# Patient Record
Sex: MALE | Race: WHITE | NOT HISPANIC OR LATINO | Employment: OTHER | URBAN - METROPOLITAN AREA
[De-identification: names, ages, dates, MRNs, and addresses within clinical notes are randomized per-mention and may not be internally consistent; named-entity substitution may affect disease eponyms.]

---

## 2018-06-14 ENCOUNTER — OFFICE VISIT (OUTPATIENT)
Dept: URGENT CARE | Facility: CLINIC | Age: 63
End: 2018-06-14
Payer: COMMERCIAL

## 2018-06-14 VITALS
DIASTOLIC BLOOD PRESSURE: 72 MMHG | SYSTOLIC BLOOD PRESSURE: 116 MMHG | RESPIRATION RATE: 16 BRPM | TEMPERATURE: 99.2 F | HEART RATE: 80 BPM | HEIGHT: 68 IN | WEIGHT: 188 LBS | BODY MASS INDEX: 28.49 KG/M2 | OXYGEN SATURATION: 95 %

## 2018-06-14 DIAGNOSIS — J98.8 RTI (RESPIRATORY TRACT INFECTION): ICD-10-CM

## 2018-06-14 PROCEDURE — 99204 OFFICE O/P NEW MOD 45 MIN: CPT | Performed by: FAMILY MEDICINE

## 2018-06-14 RX ORDER — AMOXICILLIN AND CLAVULANATE POTASSIUM 875; 125 MG/1; MG/1
1 TABLET, FILM COATED ORAL 2 TIMES DAILY
Qty: 14 TAB | Refills: 0 | Status: SHIPPED | OUTPATIENT
Start: 2018-06-14 | End: 2018-06-21

## 2018-06-14 RX ORDER — PROMETHAZINE HYDROCHLORIDE, PHENYLEPHRINE HYDROCHLORIDE AND CODEINE PHOSPHATE 6.25; 5; 1 MG/5ML; MG/5ML; MG/5ML
5 SOLUTION ORAL EVERY 8 HOURS PRN
Qty: 140 ML | Refills: 0 | Status: SHIPPED | OUTPATIENT
Start: 2018-06-14 | End: 2018-06-24

## 2018-06-14 RX ORDER — PREDNISONE 20 MG/1
40 TABLET ORAL EVERY MORNING
Qty: 12 TAB | Refills: 0 | Status: SHIPPED | OUTPATIENT
Start: 2018-06-14 | End: 2018-06-20

## 2018-06-14 ASSESSMENT — ENCOUNTER SYMPTOMS
SINUS PAIN: 1
FOCAL WEAKNESS: 0
DIZZINESS: 0
SPUTUM PRODUCTION: 0
CHILLS: 0
SORE THROAT: 1
FEVER: 0
COUGH: 1

## 2018-06-14 NOTE — PROGRESS NOTES
"Subjective:      Hussein Sarmiento is a 63 y.o. male who presents with Cough (x1wk headache, fever )    Chief Complaint   Patient presents with   • Cough     x1wk headache, fever         - This is a very pleasant 63 y.o. male with complaints of 7-10days w/ cough and sinus pain pressure w/ DC and subjective fever. No NV/CP/SOB    - denies any chronic medical issues or medication use or allergies           ALLERGIES:  Patient has no allergy information on record.     PMH:  No past medical history on file.     MEDS:    Current Outpatient Prescriptions:   •  amoxicillin-clavulanate (AUGMENTIN) 875-125 MG Tab, Take 1 Tab by mouth 2 times a day for 7 days., Disp: 14 Tab, Rfl: 0  •  Promethazine-Phenyleph-Codeine (PHENERGAN VC CODEINE) 6.25-5-10 MG/5ML Syrup, Take 5 mL by mouth every 8 hours as needed for up to 10 days., Disp: 140 mL, Rfl: 0  •  predniSONE (DELTASONE) 20 MG Tab, Take 2 Tabs by mouth every morning for 6 days., Disp: 12 Tab, Rfl: 0    ** I have documented what I find to be significant in regards to past medical, social, family and surgical history  in my HPI or under PMH/PSH/FH review section, otherwise it is contributory **             HPI    Review of Systems   Constitutional: Negative for chills and fever.   HENT: Positive for congestion, sinus pain and sore throat.    Respiratory: Positive for cough. Negative for sputum production.    Neurological: Negative for dizziness and focal weakness.          Objective:     /72   Pulse 80   Temp 37.3 °C (99.2 °F)   Resp 16   Ht 1.727 m (5' 8\")   Wt 85.3 kg (188 lb)   SpO2 95%   BMI 28.59 kg/m²      Physical Exam   Constitutional: He appears well-developed. No distress.   HENT:   Head: Normocephalic and atraumatic.   Mouth/Throat: Oropharynx is clear and moist.   Eyes: Conjunctivae are normal.   Neck: Neck supple.   Cardiovascular: Regular rhythm.    No murmur heard.  Pulmonary/Chest: Effort normal and breath sounds normal. No respiratory distress. "   Neurological: He is alert. He exhibits normal muscle tone.   Skin: Skin is warm and dry.   Psychiatric: He has a normal mood and affect. Judgment normal.   Nursing note and vitals reviewed.              Assessment/Plan:         1. RTI (respiratory tract infection)  amoxicillin-clavulanate (AUGMENTIN) 875-125 MG Tab    Promethazine-Phenyleph-Codeine (PHENERGAN VC CODEINE) 6.25-5-10 MG/5ML Syrup    predniSONE (DELTASONE) 20 MG Tab             Dx & d/c instructions discussed w/ patient and/or family members. Follow up w/ Prvt Dr or here in 3-4 days if not getting better, sooner if needed,  ER if worse and UC/PCP unavailable.        Possible side effects (i.e. Rash, GI upset/constipation, sedation, elevation of BP or sugars) of any medications given discussed.

## 2019-10-18 ENCOUNTER — TELEPHONE (OUTPATIENT)
Dept: SCHEDULING | Facility: IMAGING CENTER | Age: 64
End: 2019-10-18

## 2020-02-04 ENCOUNTER — OFFICE VISIT (OUTPATIENT)
Dept: MEDICAL GROUP | Facility: MEDICAL CENTER | Age: 65
End: 2020-02-04
Payer: MEDICARE

## 2020-02-04 VITALS
HEART RATE: 69 BPM | HEIGHT: 68 IN | BODY MASS INDEX: 27.7 KG/M2 | SYSTOLIC BLOOD PRESSURE: 120 MMHG | RESPIRATION RATE: 14 BRPM | DIASTOLIC BLOOD PRESSURE: 76 MMHG | WEIGHT: 182.76 LBS | TEMPERATURE: 98.2 F | OXYGEN SATURATION: 98 %

## 2020-02-04 DIAGNOSIS — E66.3 OVERWEIGHT: ICD-10-CM

## 2020-02-04 DIAGNOSIS — L57.0 ACTINIC KERATOSIS: ICD-10-CM

## 2020-02-04 DIAGNOSIS — Z13.29 SCREENING FOR THYROID DISORDER: ICD-10-CM

## 2020-02-04 DIAGNOSIS — Z12.83 SCREENING, MALIGNANT NEOPLASM, SKIN: ICD-10-CM

## 2020-02-04 DIAGNOSIS — Z00.00 HEALTH CARE MAINTENANCE: ICD-10-CM

## 2020-02-04 DIAGNOSIS — E78.5 DYSLIPIDEMIA: ICD-10-CM

## 2020-02-04 DIAGNOSIS — Z76.89 ENCOUNTER TO ESTABLISH CARE: ICD-10-CM

## 2020-02-04 DIAGNOSIS — Z23 NEED FOR VACCINATION: ICD-10-CM

## 2020-02-04 DIAGNOSIS — E55.9 HYPOVITAMINOSIS D: ICD-10-CM

## 2020-02-04 DIAGNOSIS — Z13.0 SCREENING, ANEMIA, DEFICIENCY, IRON: ICD-10-CM

## 2020-02-04 DIAGNOSIS — Z12.5 SCREENING FOR MALIGNANT NEOPLASM OF PROSTATE: ICD-10-CM

## 2020-02-04 DIAGNOSIS — G47.33 OSA ON CPAP: ICD-10-CM

## 2020-02-04 DIAGNOSIS — Z11.59 NEED FOR HEPATITIS C SCREENING TEST: ICD-10-CM

## 2020-02-04 PROCEDURE — 90662 IIV NO PRSV INCREASED AG IM: CPT | Performed by: INTERNAL MEDICINE

## 2020-02-04 PROCEDURE — 99214 OFFICE O/P EST MOD 30 MIN: CPT | Mod: 25 | Performed by: INTERNAL MEDICINE

## 2020-02-04 PROCEDURE — G0008 ADMIN INFLUENZA VIRUS VAC: HCPCS | Performed by: INTERNAL MEDICINE

## 2020-02-04 RX ORDER — FLUOROURACIL 50 MG/ML
SOLUTION TOPICAL
Qty: 10 ML | Refills: 2 | Status: SHIPPED | OUTPATIENT
Start: 2020-02-04 | End: 2020-03-17

## 2020-02-04 ASSESSMENT — PATIENT HEALTH QUESTIONNAIRE - PHQ9: CLINICAL INTERPRETATION OF PHQ2 SCORE: 0

## 2020-02-04 NOTE — PROGRESS NOTES
CHIEF COMPLAINT  Chief Complaint   Patient presents with   • Establish Care   get labs andres NICOLE  Hussein Sarmiento is a 65 y.o. male who presents today for the following     Dyslipidemia  The patient had abnormal lipid panel, no medications.  Diet: regular  Exercise: daily  BMI:  As above  FH: half-siblings     Hypovitaminosis D  The patient had low vitamin D level.  Vitamin D supplement: no    OW, Body mass index is 27.79 kg/m².  Diet: Regular  Exercise: Daily  No temperature intolerance. No change in hair/skin quality, BMs.   No HTN, buffalo hump, purple striae, flushing.  FH of obesity: siblings    Actinic keratosis  The patient has have multiple lesions consistent with actinic keratosis on the face and forehead.  He has significant sun exposure in the past.  No past medical history or family history of skin cancer.    KORIN on CPAP  The patient has have CPA for sleep apnea.  Denies daytime sleepiness and fatigue.    Reviewed PMH, PSH, FH, SH, ALL, HCM/IMM  Reviewed MEDS    CURRENT MEDICATIONS  No current outpatient medications on file.     No current facility-administered medications for this visit.      ALLERGIES  Allergies: Patient has no known allergies.  PAST MEDICAL HISTORY  Past Medical History:   Diagnosis Date   • Actinic keratosis    • Dyslipidemia      SURGICAL HISTORY  He  has a past surgical history that includes tonsillectomy.  SOCIAL HISTORY  Social History     Tobacco Use   • Smoking status: Never Smoker   • Smokeless tobacco: Never Used   Substance Use Topics   • Alcohol use: Not on file   • Drug use: Not on file     Social History     Patient does not qualify to have social determinant information on file (likely too young).   Social History Narrative   • Not on file     FAMILY HISTORY  Family History   Problem Relation Age of Onset   • Heart Disease Mother    • Cancer Father         colon   • Diabetes Sister    • Hypertension Sister    • Hyperlipidemia Sister    • Diabetes Brother    •  "Hypertension Brother    • Hyperlipidemia Brother      Family Status   Relation Name Status   • Mo     • Fa     • Sis  (Not Specified)   • Bro  (Not Specified)     ROS   Constitutional: Negative for fever, chills, fatigue.  HENT: Negative for congestion, sore throat.  Eyes: Negative for vision problems.   Respiratory: Negative for cough, shortness of breath.  Cardiovascular: Negative for chest pain, palpitations.   Gastrointestinal: Negative for heartburn, nausea, abdominal pain.   Genitourinary: Negative for dysuria.  Musculoskeletal: Negative for significant myalgia, back and joint pain.   Skin: As above.   Neuro: Negative for dizziness, weakness and headaches.   Endo/Heme/Allergies: Does not bruise/bleed easily.   Psychiatric/Behavioral: Negative for depression.    PHYSICAL EXAM   Blood Pressure 120/76 (BP Location: Left arm, Patient Position: Sitting, BP Cuff Size: Adult)   Pulse 69   Temperature 36.8 °C (98.2 °F) (Temporal)   Respiration 14   Height 1.727 m (5' 8\")   Weight 82.9 kg (182 lb 12.2 oz)   Oxygen Saturation 98%  Body mass index is 27.79 kg/m².  General:  NAD, well appearing  HEENT:   NC/AT, PERRLA, EOMI, TMs are clear. Oropharyngeal mucosa is pink,  without lesions;  no cervical / supraclavicular  lymphadenopathy, no thyromegaly.    Cardiovascular: RRR.   No m/r/g.       Lungs:   CTAB, no w/r/r, no respiratory distress.  Abdomen: Soft, NT/ND; no hepatosplenomegaly.  Extremities:  2+ DP and radial pulses bilaterally.  No c/c/e.   Skin:  Warm, dry.  Multiple lesions consistent with actinic keratosis on the forehead and face.  Neurologic: Alert & oriented x 3. CN II-XII grossly intact. No focal deficits.  Psychiatric:  Affect normal, mood normal, judgment normal.    Labs     Pending    Imaging     None    Assessment and Plan     Hussein Sarmiento is a 65 y.o. male    1. Dyslipidemia  Pending labs, advised low-calorie diet, daily exercise, weight control  - Comp Metabolic Panel; " Future  - Lipid Profile; Future    2. Hypovitaminosis D  Pending labs, advised 2000 units of vitamin D daily, OTC  - VITAMIN D,25 HYDROXY; Future    3. Overweight  Advised as above    4. Actinic keratosis  Advised to avoid sun exposure, use sunscreen    CRYOTHERAPY for 5 lesions no irritation or inflammation:  Risks (including, but not limited to: hypo or hyperpigmentation, redness, blister, blood blister, recurrence, need for further treatment, infection, scar) and benefits of cryotherapy discussed. Patient verbally agreed to proceed with treatment. 2 cryotherapy freeze thaw cycles of 10 seconds were applied.. Patient tolerated procedure well. Aftercare instructions given.  - discussed importance of regular sun protection/sunscreen use, SPF 30 or greater with broad spectrum coverage, need for reapplication every  minutes    - Fluorouracil 5 % Solution; Thin layer to affect skin 1-2 times daily for 2-6 weeks. Skin may blister.  Dispense: 10 mL; Refill: 2  - REFERRAL TO DERMATOLOGY    5. KORIN on CPAP  Controlled, continue current treatment and pulmonology follow-up  - REFERRAL TO PULMONOLOGY    6. Need for hepatitis C screening test  - HCV Scrn ( [id number] 1xLife); Future    7. Need for vaccination  Information was provided to the patient regarding the vaccine, including side effects. Vaccine was given by my medical assistant under my supervision.  - Influenza Vaccine, High Dose (65+ Only)    8. Screening, anemia, deficiency, iron  - CBC WITH DIFFERENTIAL; Future    9. Screening for thyroid disorder  - TSH; Future    10. Screening for malignant neoplasm of prostate  - PROSTATE SPECIFIC AG SCREENING; Future    11. Screening, malignant neoplasm, skin  - REFERRAL TO DERMATOLOGY    12. Health care maintenance  13. Encounter to establish care  Reviewed PMH, PSH, FH, SH, ALL, MEDS, HCM/IMM.   Advised healthy habits, diet, exercise.    Counseling:   - Smoking:  Nonsmoker    Followup: within 3 months labs and  annual    All questions are answered.    Please note that this dictation was created using voice recognition software, and that there might be errors of honey and possibly content.

## 2020-02-11 ENCOUNTER — HOSPITAL ENCOUNTER (OUTPATIENT)
Dept: LAB | Facility: MEDICAL CENTER | Age: 65
End: 2020-02-11
Attending: INTERNAL MEDICINE
Payer: MEDICARE

## 2020-02-11 DIAGNOSIS — E55.9 HYPOVITAMINOSIS D: ICD-10-CM

## 2020-02-11 DIAGNOSIS — Z13.0 SCREENING, ANEMIA, DEFICIENCY, IRON: ICD-10-CM

## 2020-02-11 DIAGNOSIS — Z11.59 NEED FOR HEPATITIS C SCREENING TEST: ICD-10-CM

## 2020-02-11 DIAGNOSIS — Z13.29 SCREENING FOR THYROID DISORDER: ICD-10-CM

## 2020-02-11 DIAGNOSIS — E78.5 DYSLIPIDEMIA: ICD-10-CM

## 2020-02-11 DIAGNOSIS — Z12.5 SCREENING FOR MALIGNANT NEOPLASM OF PROSTATE: ICD-10-CM

## 2020-02-11 LAB
BASOPHILS # BLD AUTO: 0.8 % (ref 0–1.8)
BASOPHILS # BLD: 0.06 K/UL (ref 0–0.12)
EOSINOPHIL # BLD AUTO: 0.12 K/UL (ref 0–0.51)
EOSINOPHIL NFR BLD: 1.6 % (ref 0–6.9)
ERYTHROCYTE [DISTWIDTH] IN BLOOD BY AUTOMATED COUNT: 47.1 FL (ref 35.9–50)
HCT VFR BLD AUTO: 43.7 % (ref 42–52)
HGB BLD-MCNC: 14.3 G/DL (ref 14–18)
IMM GRANULOCYTES # BLD AUTO: 0.02 K/UL (ref 0–0.11)
IMM GRANULOCYTES NFR BLD AUTO: 0.3 % (ref 0–0.9)
LYMPHOCYTES # BLD AUTO: 2.98 K/UL (ref 1–4.8)
LYMPHOCYTES NFR BLD: 39 % (ref 22–41)
MCH RBC QN AUTO: 31.6 PG (ref 27–33)
MCHC RBC AUTO-ENTMCNC: 32.7 G/DL (ref 33.7–35.3)
MCV RBC AUTO: 96.5 FL (ref 81.4–97.8)
MONOCYTES # BLD AUTO: 0.53 K/UL (ref 0–0.85)
MONOCYTES NFR BLD AUTO: 6.9 % (ref 0–13.4)
NEUTROPHILS # BLD AUTO: 3.94 K/UL (ref 1.82–7.42)
NEUTROPHILS NFR BLD: 51.4 % (ref 44–72)
NRBC # BLD AUTO: 0 K/UL
NRBC BLD-RTO: 0 /100 WBC
PLATELET # BLD AUTO: 271 K/UL (ref 164–446)
PMV BLD AUTO: 9.9 FL (ref 9–12.9)
RBC # BLD AUTO: 4.53 M/UL (ref 4.7–6.1)
WBC # BLD AUTO: 7.7 K/UL (ref 4.8–10.8)

## 2020-02-11 PROCEDURE — 82306 VITAMIN D 25 HYDROXY: CPT

## 2020-02-11 PROCEDURE — 84443 ASSAY THYROID STIM HORMONE: CPT

## 2020-02-11 PROCEDURE — 36415 COLL VENOUS BLD VENIPUNCTURE: CPT

## 2020-02-11 PROCEDURE — G0472 HEP C SCREEN HIGH RISK/OTHER: HCPCS

## 2020-02-11 PROCEDURE — 84153 ASSAY OF PSA TOTAL: CPT

## 2020-02-11 PROCEDURE — 80053 COMPREHEN METABOLIC PANEL: CPT

## 2020-02-11 PROCEDURE — 85025 COMPLETE CBC W/AUTO DIFF WBC: CPT

## 2020-02-11 PROCEDURE — 80061 LIPID PANEL: CPT

## 2020-02-12 LAB
25(OH)D3 SERPL-MCNC: 23 NG/ML (ref 30–100)
ALBUMIN SERPL BCP-MCNC: 4.4 G/DL (ref 3.2–4.9)
ALBUMIN/GLOB SERPL: 1.6 G/DL
ALP SERPL-CCNC: 47 U/L (ref 30–99)
ALT SERPL-CCNC: 16 U/L (ref 2–50)
ANION GAP SERPL CALC-SCNC: 9 MMOL/L (ref 0–11.9)
AST SERPL-CCNC: 25 U/L (ref 12–45)
BILIRUB SERPL-MCNC: 0.6 MG/DL (ref 0.1–1.5)
BUN SERPL-MCNC: 17 MG/DL (ref 8–22)
CALCIUM SERPL-MCNC: 9.2 MG/DL (ref 8.5–10.5)
CHLORIDE SERPL-SCNC: 105 MMOL/L (ref 96–112)
CHOLEST SERPL-MCNC: 193 MG/DL (ref 100–199)
CO2 SERPL-SCNC: 26 MMOL/L (ref 20–33)
CREAT SERPL-MCNC: 1.4 MG/DL (ref 0.5–1.4)
FASTING STATUS PATIENT QL REPORTED: NORMAL
GLOBULIN SER CALC-MCNC: 2.7 G/DL (ref 1.9–3.5)
GLUCOSE SERPL-MCNC: 97 MG/DL (ref 65–99)
HCV AB S/CO SERPL IA: NEGATIVE
HDLC SERPL-MCNC: 58 MG/DL
LDLC SERPL CALC-MCNC: 112 MG/DL
POTASSIUM SERPL-SCNC: 4.7 MMOL/L (ref 3.6–5.5)
PROT SERPL-MCNC: 7.1 G/DL (ref 6–8.2)
PSA SERPL-MCNC: 0.83 NG/ML (ref 0–4)
SODIUM SERPL-SCNC: 140 MMOL/L (ref 135–145)
TRIGL SERPL-MCNC: 114 MG/DL (ref 0–149)
TSH SERPL DL<=0.005 MIU/L-ACNC: 3.57 UIU/ML (ref 0.38–5.33)

## 2020-02-15 PROBLEM — R94.4 DECREASED GFR: Status: ACTIVE | Noted: 2020-02-15

## 2020-02-15 PROBLEM — D64.9 ANEMIA, UNSPECIFIED: Status: ACTIVE | Noted: 2020-02-15

## 2020-02-15 PROBLEM — Z00.00 MEDICARE ANNUAL WELLNESS VISIT, INITIAL: Status: ACTIVE | Noted: 2020-02-15

## 2020-02-18 ENCOUNTER — OFFICE VISIT (OUTPATIENT)
Dept: MEDICAL GROUP | Facility: MEDICAL CENTER | Age: 65
End: 2020-02-18
Payer: MEDICARE

## 2020-02-18 VITALS
RESPIRATION RATE: 14 BRPM | OXYGEN SATURATION: 97 % | HEIGHT: 68 IN | BODY MASS INDEX: 27.8 KG/M2 | WEIGHT: 183.42 LBS | SYSTOLIC BLOOD PRESSURE: 122 MMHG | DIASTOLIC BLOOD PRESSURE: 74 MMHG | HEART RATE: 65 BPM | TEMPERATURE: 98.2 F

## 2020-02-18 DIAGNOSIS — D64.9 ANEMIA, UNSPECIFIED TYPE: ICD-10-CM

## 2020-02-18 DIAGNOSIS — R94.4 DECREASED GFR: ICD-10-CM

## 2020-02-18 DIAGNOSIS — L57.0 ACTINIC KERATOSIS: ICD-10-CM

## 2020-02-18 DIAGNOSIS — E55.9 HYPOVITAMINOSIS D: ICD-10-CM

## 2020-02-18 DIAGNOSIS — Z00.00 MEDICARE ANNUAL WELLNESS VISIT, INITIAL: ICD-10-CM

## 2020-02-18 DIAGNOSIS — G47.33 OSA ON CPAP: ICD-10-CM

## 2020-02-18 DIAGNOSIS — H91.92 DECREASED HEARING, LEFT: ICD-10-CM

## 2020-02-18 DIAGNOSIS — Z00.00 HEALTH CARE MAINTENANCE: ICD-10-CM

## 2020-02-18 DIAGNOSIS — E78.5 DYSLIPIDEMIA: ICD-10-CM

## 2020-02-18 PROCEDURE — G0438 PPPS, INITIAL VISIT: HCPCS | Performed by: INTERNAL MEDICINE

## 2020-02-18 PROCEDURE — 99213 OFFICE O/P EST LOW 20 MIN: CPT | Mod: 25 | Performed by: INTERNAL MEDICINE

## 2020-02-18 ASSESSMENT — ENCOUNTER SYMPTOMS: GENERAL WELL-BEING: EXCELLENT

## 2020-02-18 ASSESSMENT — ACTIVITIES OF DAILY LIVING (ADL): BATHING_REQUIRES_ASSISTANCE: 0

## 2020-02-18 ASSESSMENT — PATIENT HEALTH QUESTIONNAIRE - PHQ9: CLINICAL INTERPRETATION OF PHQ2 SCORE: 0

## 2020-02-18 NOTE — PROGRESS NOTES
Chief Complaint   Patient presents with   • Results     Dyslipidemia    HPI:  Hussein Sarmiento Jr. is a 65 y.o. here for Medicare Annual Wellness Visit     Decreased GFR  The patient had slightly decreased GFR, with normal electrolytes and creatinine.  Fluid intake varied, probably insufficient.  No significant NSAIDs use.    Dyslipidemia  The patient had abnormal lipid panel, no medications.  Diet: regular  Exercise: daily  BMI: As above  FH: half-siblings      Hypovitaminosis D  The patient had low vitamin D level.  Vitamin D supplement: no     Actinic keratosis  The patient has have multiple lesions consistent with actinic keratosis on the face and forehead.  He has significant sun exposure in the past.  No past medical history or family history of skin cancer.     KORIN on CPAP  The patient has have CPA for sleep apnea.  Denies daytime sleepiness and fatigue.    Decreased hearing, left ear  Diagnosed at the age of 5 years, not affecting his daily activities.  No hearing aid.    Patient Active Problem List    Diagnosis Date Noted   • Medicare annual wellness visit, initial 02/15/2020   • Anemia, unspecified 02/15/2020   • Decreased GFR 02/15/2020   • Dyslipidemia 02/04/2020   • Hypovitaminosis D 02/04/2020   • Overweight 02/04/2020   • Actinic keratosis 02/04/2020   • KORIN on CPAP 02/04/2020   • Health care maintenance 02/04/2020       Current Outpatient Medications   Medication Sig Dispense Refill   • Fluorouracil 5 % Solution Thin layer to affect skin 1-2 times daily for 2-6 weeks. Skin may blister. 10 mL 2     No current facility-administered medications for this visit.           Current supplements as per medication list.     Allergies: Patient has no known allergies.    Current social contact/activities: Family, 12 yrs old son, take care of private property    He  reports that he has never smoked. He has never used smokeless tobacco.  Counseling given: Not Answered    DPA/Advanced Directive:   discussed    ROS:    Gait: Uses no assistive device  Ostomy: No  Other tubes: No  Amputations: No  Chronic oxygen use: No  Last eye exam: 2020  Wears hearing aids: No   : Denies any urinary leakage during the last 6 months    Screening:    Depression Screening  Little interest or pleasure in doing things?  0 - not at all  Feeling down, depressed , or hopeless? 0 - not at all  Patient Health Questionnaire Score: 0     Screening for Cognitive Impairment  Three Minute Recall (village, kitchen, baby) 3/3    Jono clock face with all 12 numbers and set the hands to show 10 past 10.  Yes    Cognitive concerns identified deferred for follow up unless specifically addressed in assessment and plan.    Fall Risk Assessment  Has the patient had two or more falls in the last year or any fall with injury in the last year?  No    Safety Assessment  Throw rugs on floor.  Yes  Handrails on all stairs.  Yes  Good lighting in all hallways.  Yes  Difficulty hearing.  No  Patient counseled about all safety risks that were identified.    Functional Assessment ADLs  Are there any barriers preventing you from cooking for yourself or meeting nutritional needs?  No.    Are there any barriers preventing you from driving safely or obtaining transportation?  No.    Are there any barriers preventing you from using a telephone or calling for help?  No.    Are there any barriers preventing you from shopping?  No.    Are there any barriers preventing you from taking care of your own finances?  No.    Are there any barriers preventing you from managing your medications?   .    Are there any barriers preventing you from showering, bathing or dressing yourself?  No.    Are you currently engaging in any exercise or physical activity?  Yes.     What is your perception of your health?  Excellent.    Health Maintenance Summary                IMM ZOSTER VACCINES Overdue 4/1/2015      Done 2/4/2015 Imm Admin: Zoster Vaccine Live (ZVL) (Zostavax)    IMM  "PNEUMOCOCCAL VACCINE: 65+ Years Overdue 1/5/2020     IMM DTaP/Tdap/Td Vaccine Postponed 2/4/2021 Originally 1/5/1966. Patient Refused    COLONOSCOPY Next Due 3/11/2024      Done 3/11/2014 AMB REFERRAL TO GI FOR COLONOSCOPY        Patient Care Team:  Jessica Loza M.D. as PCP - General (Family Medicine)    Social History     Tobacco Use   • Smoking status: Never Smoker   • Smokeless tobacco: Never Used   Substance Use Topics   • Alcohol use: Not on file   • Drug use: Not on file     Family History   Problem Relation Age of Onset   • Heart Disease Mother    • Cancer Father         colon   • Diabetes Sister    • Hypertension Sister    • Hyperlipidemia Sister    • Diabetes Brother    • Hypertension Brother    • Hyperlipidemia Brother      He  has a past medical history of Actinic keratosis and Dyslipidemia.   Past Surgical History:   Procedure Laterality Date   • TONSILLECTOMY       Exam:   /74 (BP Location: Left arm, Patient Position: Sitting, BP Cuff Size: Adult)   Pulse 65   Temp 36.8 °C (98.2 °F) (Temporal)   Resp 14   Ht 1.727 m (5' 8\")   Wt 83.2 kg (183 lb 6.8 oz)   SpO2 97%  Body mass index is 27.89 kg/m².  Hearing fair.    Dentition good  Alert, oriented in no acute distress.  Eye contact is good, speech goal directed, affect calm    Labs  Reviewed and discussed    Lab Results   Component Value Date/Time    CHOLSTRLTOT 193 02/11/2020 09:41 AM     (H) 02/11/2020 09:41 AM    HDL 58 02/11/2020 09:41 AM    TRIGLYCERIDE 114 02/11/2020 09:41 AM       Lab Results   Component Value Date/Time    SODIUM 140 02/11/2020 09:41 AM    POTASSIUM 4.7 02/11/2020 09:41 AM    CHLORIDE 105 02/11/2020 09:41 AM    CO2 26 02/11/2020 09:41 AM    GLUCOSE 97 02/11/2020 09:41 AM    BUN 17 02/11/2020 09:41 AM    CREATININE 1.40 02/11/2020 09:41 AM     Lab Results   Component Value Date/Time    ALKPHOSPHAT 47 02/11/2020 09:41 AM    ASTSGOT 25 02/11/2020 09:41 AM    ALTSGPT 16 02/11/2020 09:41 AM    TBILIRUBIN 0.6 " 02/11/2020 09:41 AM      No results found for: HBA1C  No results found for: TSH  No results found for: FREET4  Lab Results   Component Value Date/Time    WBC 7.7 02/11/2020 09:41 AM    RBC 4.53 (L) 02/11/2020 09:41 AM    HEMOGLOBIN 14.3 02/11/2020 09:41 AM    HEMATOCRIT 43.7 02/11/2020 09:41 AM    MCV 96.5 02/11/2020 09:41 AM    MCH 31.6 02/11/2020 09:41 AM    MCHC 32.7 (L) 02/11/2020 09:41 AM    MPV 9.9 02/11/2020 09:41 AM    NEUTSPOLYS 51.40 02/11/2020 09:41 AM    LYMPHOCYTES 39.00 02/11/2020 09:41 AM    MONOCYTES 6.90 02/11/2020 09:41 AM    EOSINOPHILS 1.60 02/11/2020 09:41 AM    BASOPHILS 0.80 02/11/2020 09:41 AM      Assessment and Plan    1. Medicare annual wellness visit, initial  Reviewed PMH, PSH, FH, SH, ALL, MEDS, HCM/IMM.   - Initial Annual Wellness Visit - Includes PPPS ()    2. Health care maintenance  Per HPI  - Initial Annual Wellness Visit - Includes PPPS ()    3. Decreased GFR  New problem  Advised good fluid intake greater than 30 ounces a day, avoid NSAIDs  - Comp Metabolic Panel; Future  - Initial Annual Wellness Visit - Includes PPPS ()  - Comp Metabolic Panel; Future    4. Dyslipidemia  -Discussed treatment options, patient prefers low-calorie diet, exercise, weight control   - Comp Metabolic Panel; Future  - Lipid Profile; Future  - Initial Annual Wellness Visit - Includes PPPS ()    5. Hypovitaminosis D  Advised 2000 units vitamin D daily, OTC  - VITAMIN D,25 HYDROXY; Future  - Initial Annual Wellness Visit - Includes PPPS ()  - VITAMIN D,25 HYDROXY; Future    6. Anemia, unspecified type  Pending labs  - OCCULT BLOOD FECES IMMUNOASSAY; Future  - VIT B12,  FOLIC ACID  - CBC WITH DIFFERENTIAL; Future  - Initial Annual Wellness Visit - Includes PPPS ()  - CBC WITH DIFFERENTIAL; Future  - OCCULT BLOOD FECES IMMUNOASSAY; Future    7. Actinic keratosis  Advised   - Initial Annual Wellness Visit - Includes PPPS ()    8. KORIN on CPAP  Controlled, continue current  treatment and pulmonology follow-up  - Initial Annual Wellness Visit - Includes PPPS ()    9. Decreased hearing, left  No hearing aids, daily activities are not affected  - Initial Annual Wellness Visit - Includes PPPS ()    Services suggested: No services needed at this time  Health Care Screening: Age-appropriate preventive services recommended by USPTF and ACIP covered by Medicare were discussed today. Services ordered if indicated and agreed upon by the patient.  Referrals offered: Community-based lifestyle interventions to reduce health risks and promote self-management and wellness, fall prevention, nutrition, physical activity, tobacco-use cessation, weight loss, and mental health services as per orders if indicated.    Discussion today about general wellness and lifestyle habits:    · Prevent falls and reduce trip hazards; Cautioned about securing or removing rugs.  · Have a working fire alarm and carbon monoxide detector;   · Engage in regular physical activity and social activities     Follow-up: Return in about 6 months (around 8/18/2020) for Labs.

## 2020-02-19 ENCOUNTER — PATIENT OUTREACH (OUTPATIENT)
Dept: HEALTH INFORMATION MANAGEMENT | Facility: OTHER | Age: 65
End: 2020-02-19

## 2020-02-19 NOTE — PROGRESS NOTES
Outcome: Left Message    Please transfer to Patient Outreach Team at [phone] when patient returns call.      [facility] Verified: yes    Attempt # 1

## 2020-02-21 ENCOUNTER — PATIENT OUTREACH (OUTPATIENT)
Dept: HEALTH INFORMATION MANAGEMENT | Facility: OTHER | Age: 65
End: 2020-02-21

## 2020-02-21 NOTE — PROGRESS NOTES
Patient called wanting a sooner appointment with the Sleep center. The 2 days I had patient was unavailable. I advised I would watch the schedule and call him back if sooner appointments available.

## 2020-03-11 ENCOUNTER — OFFICE VISIT (OUTPATIENT)
Dept: DERMATOLOGY | Facility: IMAGING CENTER | Age: 65
End: 2020-03-11
Payer: MEDICARE

## 2020-03-11 VITALS — HEIGHT: 68 IN | BODY MASS INDEX: 26.98 KG/M2 | TEMPERATURE: 98 F | WEIGHT: 178 LBS

## 2020-03-11 DIAGNOSIS — L82.1 SEBORRHEIC KERATOSES: ICD-10-CM

## 2020-03-11 DIAGNOSIS — Z12.83 SKIN CANCER SCREENING: ICD-10-CM

## 2020-03-11 DIAGNOSIS — D22.9 MULTIPLE NEVI: ICD-10-CM

## 2020-03-11 DIAGNOSIS — L57.0 ACTINIC KERATOSES: ICD-10-CM

## 2020-03-11 DIAGNOSIS — L81.4 LENTIGINES: ICD-10-CM

## 2020-03-11 DIAGNOSIS — D18.01 CHERRY ANGIOMA: ICD-10-CM

## 2020-03-11 PROCEDURE — 99203 OFFICE O/P NEW LOW 30 MIN: CPT | Performed by: NURSE PRACTITIONER

## 2020-03-11 ASSESSMENT — ENCOUNTER SYMPTOMS
WEIGHT LOSS: 0
CHILLS: 0
FEVER: 0
DIAPHORESIS: 0

## 2020-03-11 ASSESSMENT — FIBROSIS 4 INDEX: FIB4 SCORE: 1.5

## 2020-03-11 NOTE — PROGRESS NOTES
Dermatology New Patient Visit    Chief Complaint   Patient presents with   • Establish Care     TRINITY   • Skin Lesion       Subjective:     HPI:   Hussein Sarmiento Jr. is a 65 y.o. male presenting for    Establish care for TRINITY.    No previous skin exams    HPI/location: above right eyebrow, brown macule  Time present: 1 year  Painful lesion: No  Itching lesion: No  Enlarging lesion: No  Anything make it better or worse? No      History of skin cancer: No  History of precancers/actinic keratoses: Yes, Details: AK's  History of biopsies:No  History of blistering/severe sunburns:Yes, Details: teenager  Family history of skin cancer:No  Family history of atypical moles:No            Past Medical History:   Diagnosis Date   • Actinic keratosis    • Dyslipidemia        Current Outpatient Medications on File Prior to Visit   Medication Sig Dispense Refill   • Fluorouracil 5 % Solution Thin layer to affect skin 1-2 times daily for 2-6 weeks. Skin may blister. 10 mL 2     No current facility-administered medications on file prior to visit.        No Known Allergies    Family History   Problem Relation Age of Onset   • Heart Disease Mother    • Cancer Father         colon   • Diabetes Sister    • Hypertension Sister    • Hyperlipidemia Sister    • Diabetes Brother    • Hypertension Brother    • Hyperlipidemia Brother        Social History     Socioeconomic History   • Marital status:      Spouse name: Not on file   • Number of children: Not on file   • Years of education: Not on file   • Highest education level: Not on file   Occupational History   • Not on file   Social Needs   • Financial resource strain: Not on file   • Food insecurity     Worry: Not on file     Inability: Not on file   • Transportation needs     Medical: Not on file     Non-medical: Not on file   Tobacco Use   • Smoking status: Never Smoker   • Smokeless tobacco: Never Used   Substance and Sexual Activity   • Alcohol use: Not on file   • Drug  "use: Not on file   • Sexual activity: Not on file   Lifestyle   • Physical activity     Days per week: Not on file     Minutes per session: Not on file   • Stress: Not on file   Relationships   • Social connections     Talks on phone: Not on file     Gets together: Not on file     Attends Protestant service: Not on file     Active member of club or organization: Not on file     Attends meetings of clubs or organizations: Not on file     Relationship status: Not on file   • Intimate partner violence     Fear of current or ex partner: Not on file     Emotionally abused: Not on file     Physically abused: Not on file     Forced sexual activity: Not on file   Other Topics Concern   • Not on file   Social History Narrative   • Not on file       Review of Systems   Constitutional: Negative for chills, diaphoresis, fever, malaise/fatigue and weight loss.   Skin: Negative for itching and rash.        Objective:     A full mucocutaneous exam was completed including: scalp, hair, ears, face, eyelids, conjunctiva, lips, gums/tongue/oropharynx, neck, chest, abdomen, back, left and right upper extremities (including hands/digits and fingernails), left and right lower extremities (including feet/toes, toenails), buttocks, excluding external genitalia (patient refusal) with the following pertinent findings listed below. Remaining above-listed examined areas within normal limits / negative for rashes or lesions.    Temp 36.7 °C (98 °F)   Ht 1.727 m (5' 8\")   Wt 80.7 kg (178 lb)     Physical Exam   Constitutional: He is oriented to person, place, and time and well-developed, well-nourished, and in no distress. No distress.   HENT:   Head: Normocephalic.   Right Ear: External ear normal.   Left Ear: External ear normal.   Mouth/Throat: Oropharynx is clear and moist.   Several scattered tan and light brown macules over face    Several medium brown stuck-on waxy papules scattered on the face    Ill-defined erythematous gritty/scaly " papules over the B/L temples         Eyes: Conjunctivae are normal.   Neck: Neck supple.   Pulmonary/Chest: Effort normal.   Lymphadenopathy:     He has no cervical adenopathy.   Neurological: He is alert and oriented to person, place, and time.   Skin: Skin is warm and dry. No rash noted. There is erythema.   Several scattered tan and light brown macules over trunk and extremities    Several scattered 1-3mm bright red macules and thin papules on the trunk and extremities    Several tan and medium brown stuck-on waxy papules and macules scattered on the trunk and extremities. All with benign-appearing pigment network patterns on dermoscopy                 Psychiatric: Mood and affect normal.   Vitals reviewed.      DATA: none applicable to review    Assessment and Plan:     1. Actinic keratoses  - patient to -re-start Efudex 5% cream to the AA of the face BID x 2-4 weeks (pending response). AVOID eye area. Side effects of cream (significant irritation, erythema, scaling, itching, weeping, crusting, pain) discussed.   - Patient instructed to protect the skin from the sun.  - Aquaphor for itching/small open sores  -re-check in 3 months for any residual    2. Multiple nevi  - Benign-appearing nature of lesions discussed. Advised to return to clinic for any new or concerning changes.  - ABCDE's of melanoma discussed      3. Seborrheic keratoses  - Benign-appearing nature of lesions discussed. Advised to return to clinic for any new or concerning changes.      4. Lentigines  - Discussed benign nature of lesions, related to sun exposure  - Discussed sun protection, hand out provided      5. Cherry angioma  - Benign-appearing nature of lesions discussed. Advised to return to clinic for any new or concerning changes.      6. Skin cancer screening  Skin cancer education  - discussed importance of sun protective clothing, eyewear  - discussed importance of daily use of broad spectrum sunscreen with SPF 30 or greater, as well  as need for reapplication ~every 2 hours when exposed to UVR  - discussed importance of regular self-exams, ideally once per month, every 12 months exams in clinic  - ABCDE's of melanoma discussed  - patient to bring any new or concerning lesions to my attention  - Patient educational handout provided and reviewed with patient        Followup: Return in about 3 months (around 6/11/2020) for post efudex re-check-jenny in one year.    COBY Baker.

## 2020-05-06 ENCOUNTER — TELEMEDICINE (OUTPATIENT)
Dept: SLEEP MEDICINE | Facility: MEDICAL CENTER | Age: 65
End: 2020-05-06
Payer: MEDICARE

## 2020-05-06 VITALS — HEIGHT: 68 IN | WEIGHT: 171 LBS | BODY MASS INDEX: 25.91 KG/M2

## 2020-05-06 DIAGNOSIS — G47.33 OSA ON CPAP: ICD-10-CM

## 2020-05-06 PROCEDURE — 99443 PR PHYSICIAN TELEPHONE EVALUATION 21-30 MIN: CPT | Mod: CR | Performed by: INTERNAL MEDICINE

## 2020-05-06 RX ORDER — CHOLECALCIFEROL (VITAMIN D3) 125 MCG
CAPSULE ORAL
COMMUNITY

## 2020-05-06 SDOH — HEALTH STABILITY: MENTAL HEALTH: HOW OFTEN DO YOU HAVE 6 OR MORE DRINKS ON ONE OCCASION?: NEVER

## 2020-05-06 SDOH — HEALTH STABILITY: MENTAL HEALTH: HOW MANY STANDARD DRINKS CONTAINING ALCOHOL DO YOU HAVE ON A TYPICAL DAY?: 1 OR 2

## 2020-05-06 SDOH — HEALTH STABILITY: MENTAL HEALTH: HOW OFTEN DO YOU HAVE A DRINK CONTAINING ALCOHOL?: 4 OR MORE TIMES A WEEK

## 2020-05-06 ASSESSMENT — FIBROSIS 4 INDEX: FIB4 SCORE: 1.5

## 2020-06-08 ENCOUNTER — HOSPITAL ENCOUNTER (OUTPATIENT)
Dept: LAB | Facility: MEDICAL CENTER | Age: 65
End: 2020-06-08
Attending: INTERNAL MEDICINE
Payer: MEDICARE

## 2020-06-08 DIAGNOSIS — E55.9 HYPOVITAMINOSIS D: ICD-10-CM

## 2020-06-08 DIAGNOSIS — D64.9 ANEMIA, UNSPECIFIED TYPE: ICD-10-CM

## 2020-06-08 DIAGNOSIS — E78.5 DYSLIPIDEMIA: ICD-10-CM

## 2020-06-08 DIAGNOSIS — R94.4 DECREASED GFR: ICD-10-CM

## 2020-06-08 LAB
25(OH)D3 SERPL-MCNC: 39 NG/ML (ref 30–100)
ALBUMIN SERPL BCP-MCNC: 4.4 G/DL (ref 3.2–4.9)
ALBUMIN/GLOB SERPL: 1.9 G/DL
ALP SERPL-CCNC: 48 U/L (ref 30–99)
ALT SERPL-CCNC: 22 U/L (ref 2–50)
ANION GAP SERPL CALC-SCNC: 11 MMOL/L (ref 7–16)
AST SERPL-CCNC: 26 U/L (ref 12–45)
BASOPHILS # BLD AUTO: 0.4 % (ref 0–1.8)
BASOPHILS # BLD: 0.04 K/UL (ref 0–0.12)
BILIRUB SERPL-MCNC: 0.6 MG/DL (ref 0.1–1.5)
BUN SERPL-MCNC: 16 MG/DL (ref 8–22)
CALCIUM SERPL-MCNC: 9.5 MG/DL (ref 8.5–10.5)
CHLORIDE SERPL-SCNC: 101 MMOL/L (ref 96–112)
CHOLEST SERPL-MCNC: 195 MG/DL (ref 100–199)
CO2 SERPL-SCNC: 25 MMOL/L (ref 20–33)
CREAT SERPL-MCNC: 1.29 MG/DL (ref 0.5–1.4)
EOSINOPHIL # BLD AUTO: 0.08 K/UL (ref 0–0.51)
EOSINOPHIL NFR BLD: 0.9 % (ref 0–6.9)
ERYTHROCYTE [DISTWIDTH] IN BLOOD BY AUTOMATED COUNT: 45.1 FL (ref 35.9–50)
FASTING STATUS PATIENT QL REPORTED: NORMAL
FOLATE SERPL-MCNC: 14.4 NG/ML
GLOBULIN SER CALC-MCNC: 2.3 G/DL (ref 1.9–3.5)
GLUCOSE SERPL-MCNC: 91 MG/DL (ref 65–99)
HCT VFR BLD AUTO: 41.8 % (ref 42–52)
HDLC SERPL-MCNC: 55 MG/DL
HGB BLD-MCNC: 13.8 G/DL (ref 14–18)
IMM GRANULOCYTES # BLD AUTO: 0.02 K/UL (ref 0–0.11)
IMM GRANULOCYTES NFR BLD AUTO: 0.2 % (ref 0–0.9)
LDLC SERPL CALC-MCNC: 114 MG/DL
LYMPHOCYTES # BLD AUTO: 2.94 K/UL (ref 1–4.8)
LYMPHOCYTES NFR BLD: 31.7 % (ref 22–41)
MCH RBC QN AUTO: 31.5 PG (ref 27–33)
MCHC RBC AUTO-ENTMCNC: 33 G/DL (ref 33.7–35.3)
MCV RBC AUTO: 95.4 FL (ref 81.4–97.8)
MONOCYTES # BLD AUTO: 0.61 K/UL (ref 0–0.85)
MONOCYTES NFR BLD AUTO: 6.6 % (ref 0–13.4)
NEUTROPHILS # BLD AUTO: 5.58 K/UL (ref 1.82–7.42)
NEUTROPHILS NFR BLD: 60.2 % (ref 44–72)
NRBC # BLD AUTO: 0 K/UL
NRBC BLD-RTO: 0 /100 WBC
PLATELET # BLD AUTO: 252 K/UL (ref 164–446)
PMV BLD AUTO: 9.9 FL (ref 9–12.9)
POTASSIUM SERPL-SCNC: 4.1 MMOL/L (ref 3.6–5.5)
PROT SERPL-MCNC: 6.7 G/DL (ref 6–8.2)
RBC # BLD AUTO: 4.38 M/UL (ref 4.7–6.1)
SODIUM SERPL-SCNC: 137 MMOL/L (ref 135–145)
TRIGL SERPL-MCNC: 130 MG/DL (ref 0–149)
VIT B12 SERPL-MCNC: 491 PG/ML (ref 211–911)
WBC # BLD AUTO: 9.3 K/UL (ref 4.8–10.8)

## 2020-06-08 PROCEDURE — 82306 VITAMIN D 25 HYDROXY: CPT

## 2020-06-08 PROCEDURE — 85025 COMPLETE CBC W/AUTO DIFF WBC: CPT

## 2020-06-08 PROCEDURE — 80061 LIPID PANEL: CPT

## 2020-06-08 PROCEDURE — 36415 COLL VENOUS BLD VENIPUNCTURE: CPT

## 2020-06-08 PROCEDURE — 82746 ASSAY OF FOLIC ACID SERUM: CPT

## 2020-06-08 PROCEDURE — 82607 VITAMIN B-12: CPT

## 2020-06-08 PROCEDURE — 80053 COMPREHEN METABOLIC PANEL: CPT

## 2020-06-09 ENCOUNTER — HOSPITAL ENCOUNTER (OUTPATIENT)
Facility: MEDICAL CENTER | Age: 65
End: 2020-06-09
Attending: INTERNAL MEDICINE
Payer: MEDICARE

## 2020-06-09 DIAGNOSIS — D64.9 ANEMIA, UNSPECIFIED TYPE: ICD-10-CM

## 2020-06-09 PROCEDURE — 82274 ASSAY TEST FOR BLOOD FECAL: CPT

## 2020-06-12 LAB — HEMOCCULT STL QL IA: NEGATIVE

## 2020-06-15 ENCOUNTER — OFFICE VISIT (OUTPATIENT)
Dept: DERMATOLOGY | Facility: IMAGING CENTER | Age: 65
End: 2020-06-15
Payer: MEDICARE

## 2020-06-15 DIAGNOSIS — L57.0 ACTINIC KERATOSES: ICD-10-CM

## 2020-06-15 PROCEDURE — 17000 DESTRUCT PREMALG LESION: CPT | Performed by: NURSE PRACTITIONER

## 2020-06-15 PROCEDURE — 17003 DESTRUCT PREMALG LES 2-14: CPT | Performed by: NURSE PRACTITIONER

## 2020-06-15 ASSESSMENT — ENCOUNTER SYMPTOMS
WEIGHT LOSS: 0
FEVER: 0
DIAPHORESIS: 0
CHILLS: 0

## 2020-06-15 NOTE — PROGRESS NOTES
Dermatology Return Patient Visit    Chief Complaint   Patient presents with   • Follow-Up   • Actinic Keratosis       Subjective:     HPI:   Hussein Sarmiento Jr. is a 65 y.o. male presenting for    Follow up spot check AK   Patient completed course of Efudex 5% cream   applied to face and forehead 03/17- 04/28 , patient doing well       History of skin cancer: No  History of precancers/actinic keratoses: Yes, Details: AK's  History of biopsies:No  History of blistering/severe sunburns:Yes, Details: teenager  Family history of skin cancer:No  Family history of atypical moles:No            Past Medical History:   Diagnosis Date   • Actinic keratosis    • Chickenpox    • Dyslipidemia    • [place]istanian measles    • Sleep apnea        Current Outpatient Medications on File Prior to Visit   Medication Sig Dispense Refill   • Cholecalciferol (VITAMIN D3) 50 MCG (2000 UT) Tab Take  by mouth.       No current facility-administered medications on file prior to visit.        No Known Allergies    Family History   Problem Relation Age of Onset   • Heart Disease Mother    • Cancer Father         colon   • Diabetes Sister    • Hypertension Sister    • Hyperlipidemia Sister    • Sleep Apnea Sister    • Diabetes Brother    • Hypertension Brother    • Hyperlipidemia Brother    • Sleep Apnea Brother        Social History     Socioeconomic History   • Marital status:      Spouse name: Not on file   • Number of children: Not on file   • Years of education: Not on file   • Highest education level: Not on file   Occupational History   • Not on file   Social Needs   • Financial resource strain: Not on file   • Food insecurity     Worry: Not on file     Inability: Not on file   • Transportation needs     Medical: Not on file     Non-medical: Not on file   Tobacco Use   • Smoking status: Never Smoker   • Smokeless tobacco: Never Used   Substance and Sexual Activity   • Alcohol use: Yes     Frequency: 4 or more times a week      Drinks per session: 1 or 2     Binge frequency: Never   • Drug use: Never   • Sexual activity: Not on file   Lifestyle   • Physical activity     Days per week: Not on file     Minutes per session: Not on file   • Stress: Not on file   Relationships   • Social connections     Talks on phone: Not on file     Gets together: Not on file     Attends Anabaptism service: Not on file     Active member of club or organization: Not on file     Attends meetings of clubs or organizations: Not on file     Relationship status: Not on file   • Intimate partner violence     Fear of current or ex partner: Not on file     Emotionally abused: Not on file     Physically abused: Not on file     Forced sexual activity: Not on file   Other Topics Concern   • Not on file   Social History Narrative   • Not on file       Review of Systems   Constitutional: Negative for chills, diaphoresis, fever, malaise/fatigue and weight loss.   Skin: Negative for itching and rash.        Objective:     A focused cutaneous exam was completed including: scalp, hair, ears, face, eyelids, conjunctiva and lips with the following pertinent findings listed below. Remaining above-listed examined areas within normal limits / negative for rashes or lesions.    There were no vitals taken for this visit.    Physical Exam   Constitutional: He is oriented to person, place, and time and well-developed, well-nourished, and in no distress. No distress.   HENT:   Head: Normocephalic.   Ill-defined erythematous gritty/scaly papules over the forehead temples nose         Eyes: Conjunctivae are normal.   Pulmonary/Chest: Effort normal.   Neurological: He is alert and oriented to person, place, and time.   Skin: Skin is warm and dry. No rash noted. There is erythema.                  Psychiatric: Mood and affect normal.   Vitals reviewed.      DATA: none applicable to review    Assessment and Plan:     1. Actinic keratoses    CRYOTHERAPY:  Risks (including, but not limited to:  hypo or hyperpigmentation, redness, blister, blood blister, recurrence, need for further treatment, infection, scar) and benefits of cryotherapy discussed. Patient verbally agreed to proceed with treatment. 2 cryotherapy freeze thaw cycles of 10 seconds were applied to 5 lesions on face with cryac. Patient tolerated procedure well. Aftercare instructions given.        Followup: Return in about 6 months (around 12/15/2020) for TRINITY and AKs re-check or sooner for any concerns.    COBY Baker.

## 2020-06-25 ENCOUNTER — PATIENT MESSAGE (OUTPATIENT)
Dept: SLEEP MEDICINE | Facility: MEDICAL CENTER | Age: 65
End: 2020-06-25

## 2020-06-29 ENCOUNTER — TELEPHONE (OUTPATIENT)
Dept: SLEEP MEDICINE | Facility: MEDICAL CENTER | Age: 65
End: 2020-06-29

## 2020-06-29 ENCOUNTER — PATIENT MESSAGE (OUTPATIENT)
Dept: SLEEP MEDICINE | Facility: MEDICAL CENTER | Age: 65
End: 2020-06-29

## 2020-06-29 DIAGNOSIS — G47.33 OSA (OBSTRUCTIVE SLEEP APNEA): ICD-10-CM

## 2020-06-29 NOTE — PROGRESS NOTES
Telemedicine Visit: Established Patient     This Remote Face to Face encounter was conducted via Zoom. Given the importance of social distancing and other strategies recommended to reduce the risk of COVID-19 transmission, I am providing medical care to this patient via audio/video visit in place of an in person visit at the request of the patient. Verbal consent to telehealth, risks, benefits, and consequences were discussed. Patient retains the right to withdraw at any time. All existing confidentiality protections apply. The patient has access to all transmitted medical information. No dissemination of any patient images or information to other entities without further written consent.    CHIEF COMPLAINT     Dyslipidemia, labs    HPI  Hussein Sarmiento Jr. is a 65 y.o. male who presents today for the following     Decreased GFR  The patient had slightly decreased GFR, with normal electrolytes and creatinine.  Fluid intake varied, probably insufficient.  No significant NSAIDs use.     Dyslipidemia  The patient had abnormal lipid panel, no medications.  Diet: regular  Exercise: daily  BMI: 26  FH: half-siblings    Anemia  The patient has have borderline low RBC count,   - normal    - MCV/RDW   - B12/folate    - FIT test  Denies   - lymphadenopathy, anorexia, weight loss  - fatigue.      Hypovitaminosis D  The patient had low vitamin D level.  Vitamin D supplement: OTC.     Reviewed PMH, PSH, FH, SH, ALL, HCM/IMM, no changes  Reviewed MEDS, no changes    Patient Active Problem List    Diagnosis Date Noted   • Decreased hearing, left 02/18/2020   • Medicare annual wellness visit, initial 02/15/2020   • Anemia, unspecified 02/15/2020   • Decreased GFR 02/15/2020   • Dyslipidemia 02/04/2020   • Hypovitaminosis D 02/04/2020   • Overweight 02/04/2020   • Actinic keratosis 02/04/2020   • KORIN on CPAP 02/04/2020   • Health care maintenance 02/04/2020     CURRENT MEDICATIONS  Current Outpatient Medications   Medication  "Sig Dispense Refill   • Cholecalciferol (VITAMIN D3) 50 MCG (2000 UT) Tab Take  by mouth.       No current facility-administered medications for this visit.      ALLERGIES  Allergies: Patient has no known allergies.  PAST MEDICAL HISTORY  Past Medical History:   Diagnosis Date   • Actinic keratosis    • Chickenpox    • Dyslipidemia    • [place]i measles    • Sleep apnea      SURGICAL HISTORY  He  has a past surgical history that includes tonsillectomy.  SOCIAL HISTORY  Social History     Tobacco Use   • Smoking status: Never Smoker   • Smokeless tobacco: Never Used   Substance Use Topics   • Alcohol use: Yes     Frequency: 4 or more times a week     Drinks per session: 1 or 2     Binge frequency: Never   • Drug use: Never     Social History     Social History Narrative   • Not on file     FAMILY HISTORY  Family History   Problem Relation Age of Onset   • Heart Disease Mother    • Cancer Father         colon   • Diabetes Sister    • Hypertension Sister    • Hyperlipidemia Sister    • Sleep Apnea Sister    • Diabetes Brother    • Hypertension Brother    • Hyperlipidemia Brother    • Sleep Apnea Brother      Family Status   Relation Name Status   • Mo     • Fa     • Sis  Alive   • Bro  Alive     ROS   Constitutional: Negative for fever, chills, fatigue.  HENT: Negative for congestion, sore throat.  Eyes: Negative for vision problems.   Respiratory: Negative for cough, shortness of breath.  Cardiovascular: Negative for chest pain, palpitations.   Gastrointestinal: Negative for heartburn, nausea, abdominal pain.   Genitourinary: Negative for dysuria.  Musculoskeletal: Negative for significant myalgia, back and joint pain.   Skin: Negative for rash.   Neuro: Negative for dizziness, weakness and headaches.   Endo/Heme/Allergies: Does not bruise/bleed easily.   Psychiatric/Behavioral: Negative for depression.    Objective   Pulse 70   Temperature 36.3 °C (97.3 °F)   Height 1.727 m (5' 8\")   Weight 78 kg " (172 lb)   Body Mass Index 26.15 kg/m²    Physical Exam:  Constitutional: Alert, no distress, well-groomed.  Skin: No rash in visible areas.  Eye: Round. Conjunctiva clear, lids normal.  ENMT: Lips pink without lesions, good dentition. Phonation normal.  Neck: No visible masses or thyromegaly. Moves freely without pain.  CV: no peripheral cyanosis, tachycardia.  Respiratory: Unlabored respiratory effort, no cough or audible wheezing.  Psych: Alert and oriented x3, normal affect and mood.     Labs     Labs are reviewed and discussed with a patient  Lab Results   Component Value Date/Time    CHOLSTRLTOT 195 06/08/2020 01:02 PM     (H) 06/08/2020 01:02 PM    HDL 55 06/08/2020 01:02 PM    TRIGLYCERIDE 130 06/08/2020 01:02 PM       Lab Results   Component Value Date/Time    SODIUM 137 06/08/2020 01:02 PM    POTASSIUM 4.1 06/08/2020 01:02 PM    CHLORIDE 101 06/08/2020 01:02 PM    CO2 25 06/08/2020 01:02 PM    GLUCOSE 91 06/08/2020 01:02 PM    BUN 16 06/08/2020 01:02 PM    CREATININE 1.29 06/08/2020 01:02 PM     Lab Results   Component Value Date/Time    ALKPHOSPHAT 48 06/08/2020 01:02 PM    ASTSGOT 26 06/08/2020 01:02 PM    ALTSGPT 22 06/08/2020 01:02 PM    TBILIRUBIN 0.6 06/08/2020 01:02 PM      No results found for: HBA1C  No results found for: TSH  No results found for: FREET4    Lab Results   Component Value Date/Time    WBC 9.3 06/08/2020 01:02 PM    RBC 4.38 (L) 06/08/2020 01:02 PM    HEMOGLOBIN 13.8 (L) 06/08/2020 01:02 PM    HEMATOCRIT 41.8 (L) 06/08/2020 01:02 PM    MCV 95.4 06/08/2020 01:02 PM    MCH 31.5 06/08/2020 01:02 PM    MCHC 33.0 (L) 06/08/2020 01:02 PM    MPV 9.9 06/08/2020 01:02 PM    NEUTSPOLYS 60.20 06/08/2020 01:02 PM    LYMPHOCYTES 31.70 06/08/2020 01:02 PM    MONOCYTES 6.60 06/08/2020 01:02 PM    EOSINOPHILS 0.90 06/08/2020 01:02 PM    BASOPHILS 0.40 06/08/2020 01:02 PM      Imaging      None    Assessment and Plan     Hussein Patino Torsten Freeman is a 65 y.o. male    1. Decreased  GFR  Continued to have decreased GFR.  Advised to continue good fluid intake greater than 30 ounces a day, avoid NSAIDs  - Comp Metabolic Panel; Future    2. Dyslipidemia  No significant change and by previous discussion, patient prefers lifestyle modification with low calorie diet, daily exercise, weight control  - Comp Metabolic Panel; Future  - Lipid Profile; Future    3. Anemia, unspecified type  Follow-up labs and iron level  Fit test, B12 and folate were normal  - CBC WITH DIFFERENTIAL; Future  - IRON/TOTAL IRON BIND; Future    4. Hypovitaminosis D  Continue current supplement, follow-up labs  - VITAMIN D,25 HYDROXY; Future    5. Health care maintenance  Due immunizations X2    Follow-up: in 4 months and prn

## 2020-06-29 NOTE — TELEPHONE ENCOUNTER
Hussein Sarmiento Jr.   to Gilda Chandra M.D.         6/29/20 12:07 PM   Hi, I already asked a question about the pressure being put out by my c-pap, but today the water bowl broke during cleaning and will not hold water. They do not carry replacement parts locally in [place] so I ordered one and it will take 1-2 weeks to get here from [place]. Since my machine is 5 years old this month I'm wondering if it might be time to get a new one?     Hussein P  [phone]    Please review and advise.

## 2020-06-29 NOTE — TELEPHONE ENCOUNTER
"Copied from [employer]: Hello, I am a c-pap user and saw you a few months go. I have been using my c-pap daily for 5 years now with the same pressure, I think \"8\", and over that time the tissue in my nose has grown back to the point I can barely breathe. In other words it is harder to breathe when I wear the c-pap that when not wearing it - I really have to try hard to breathe with it on. I wondered if turning up the air pressure would help?    Please advise.     "

## 2020-06-30 ENCOUNTER — TELEMEDICINE (OUTPATIENT)
Dept: MEDICAL GROUP | Age: 65
End: 2020-06-30
Payer: MEDICARE

## 2020-06-30 VITALS — WEIGHT: 172 LBS | HEIGHT: 68 IN | BODY MASS INDEX: 26.07 KG/M2 | HEART RATE: 70 BPM | TEMPERATURE: 97.3 F

## 2020-06-30 DIAGNOSIS — E78.5 DYSLIPIDEMIA: ICD-10-CM

## 2020-06-30 DIAGNOSIS — E55.9 HYPOVITAMINOSIS D: ICD-10-CM

## 2020-06-30 DIAGNOSIS — D64.9 ANEMIA, UNSPECIFIED TYPE: ICD-10-CM

## 2020-06-30 DIAGNOSIS — R94.4 DECREASED GFR: ICD-10-CM

## 2020-06-30 DIAGNOSIS — Z00.00 HEALTH CARE MAINTENANCE: ICD-10-CM

## 2020-06-30 PROCEDURE — 99214 OFFICE O/P EST MOD 30 MIN: CPT | Mod: 95,CR | Performed by: INTERNAL MEDICINE

## 2020-06-30 ASSESSMENT — FIBROSIS 4 INDEX: FIB4 SCORE: 1.43

## 2020-07-07 ENCOUNTER — PATIENT MESSAGE (OUTPATIENT)
Dept: SLEEP MEDICINE | Facility: MEDICAL CENTER | Age: 65
End: 2020-07-07

## 2020-09-18 ENCOUNTER — OFFICE VISIT (OUTPATIENT)
Dept: DERMATOLOGY | Facility: IMAGING CENTER | Age: 65
End: 2020-09-18
Payer: MEDICARE

## 2020-09-18 DIAGNOSIS — L57.0 ACTINIC KERATOSES: ICD-10-CM

## 2020-09-18 PROCEDURE — 17000 DESTRUCT PREMALG LESION: CPT | Performed by: NURSE PRACTITIONER

## 2020-09-18 ASSESSMENT — ENCOUNTER SYMPTOMS
FEVER: 0
CHILLS: 0

## 2020-09-18 NOTE — PROGRESS NOTES
Dermatology Return Patient Visit    Chief Complaint   Patient presents with   • Follow-Up   • Actinic Keratosis       Subjective:     HPI:   Hussein Sarmiento Jr. is a 65 y.o. male presenting for    Follow up spot check AK on right side of forehead   Feels it has grown       Patient completed course of Efudex 5% cream   applied to face and forehead 03/17- 04/28 ,      History of skin cancer: No  History of precancers/actinic keratoses: Yes, Details: AK's  History of biopsies:No  History of blistering/severe sunburns:Yes, Details: teenager  Family history of skin cancer:No  Family history of atypical moles:No            Past Medical History:   Diagnosis Date   • Actinic keratosis    • Chickenpox    • Dyslipidemia    • Frisian measles    • Sleep apnea        Current Outpatient Medications on File Prior to Visit   Medication Sig Dispense Refill   • Cholecalciferol (VITAMIN D3) 50 MCG (2000 UT) Tab Take  by mouth.       No current facility-administered medications on file prior to visit.        No Known Allergies    Family History   Problem Relation Age of Onset   • Heart Disease Mother    • Cancer Father         colon   • Diabetes Sister    • Hypertension Sister    • Hyperlipidemia Sister    • Sleep Apnea Sister    • Diabetes Brother    • Hypertension Brother    • Hyperlipidemia Brother    • Sleep Apnea Brother        Social History     Socioeconomic History   • Marital status:      Spouse name: Not on file   • Number of children: Not on file   • Years of education: Not on file   • Highest education level: Not on file   Occupational History   • Not on file   Social Needs   • Financial resource strain: Not on file   • Food insecurity     Worry: Not on file     Inability: Not on file   • Transportation needs     Medical: Not on file     Non-medical: Not on file   Tobacco Use   • Smoking status: Never Smoker   • Smokeless tobacco: Never Used   Substance and Sexual Activity   • Alcohol use: Yes     Frequency: 4  or more times a week     Drinks per session: 1 or 2     Binge frequency: Never   • Drug use: Never   • Sexual activity: Not on file   Lifestyle   • Physical activity     Days per week: Not on file     Minutes per session: Not on file   • Stress: Not on file   Relationships   • Social connections     Talks on phone: Not on file     Gets together: Not on file     Attends Oriental orthodox service: Not on file     Active member of club or organization: Not on file     Attends meetings of clubs or organizations: Not on file     Relationship status: Not on file   • Intimate partner violence     Fear of current or ex partner: Not on file     Emotionally abused: Not on file     Physically abused: Not on file     Forced sexual activity: Not on file   Other Topics Concern   • Not on file   Social History Narrative   • Not on file       Review of Systems   Constitutional: Negative for chills and fever.   Skin: Negative for itching and rash.        Objective:     A focused cutaneous exam was completed including: face above mask with the following pertinent findings listed below. Remaining above-listed examined areas within normal limits / negative for rashes or lesions.    There were no vitals taken for this visit.    Physical Exam   Constitutional: He is oriented to person, place, and time and well-developed, well-nourished, and in no distress. No distress.   HENT:   Head: Normocephalic.              Pulmonary/Chest: Effort normal.   Neurological: He is alert and oriented to person, place, and time.   Skin: Skin is warm and dry. No rash noted. There is erythema.                  Psychiatric: Mood and affect normal.   Vitals reviewed.      DATA: none applicable to review    Assessment and Plan:     1. Actinic keratoses  CRYOTHERAPY:  Risks (including, but not limited to: hypo or hyperpigmentation, redness, blister, blood blister, recurrence, need for further treatment, infection, scar) and benefits of cryotherapy discussed. Patient  verbally agreed to proceed with treatment. 4 cryotherapy freeze thaw cycles of 3 seconds were applied to 1 lesion on forehead with cryac. Patient tolerated procedure well. Aftercare instructions given.      Re-check 3 weeks and if not resolved will bx        Followup: Return in about 6 weeks (around 10/30/2020) for or sooner for any concerns.    COBY Baker.

## 2020-09-25 ENCOUNTER — TELEMEDICINE (OUTPATIENT)
Dept: SLEEP MEDICINE | Facility: MEDICAL CENTER | Age: 65
End: 2020-09-25
Payer: MEDICARE

## 2020-09-25 VITALS — BODY MASS INDEX: 26.67 KG/M2 | WEIGHT: 176 LBS | HEIGHT: 68 IN

## 2020-09-25 DIAGNOSIS — G47.10 HYPERSOMNOLENCE: ICD-10-CM

## 2020-09-25 DIAGNOSIS — R09.81 CHRONIC NASAL CONGESTION: ICD-10-CM

## 2020-09-25 DIAGNOSIS — G47.33 OBSTRUCTIVE SLEEP APNEA SYNDROME: ICD-10-CM

## 2020-09-25 PROCEDURE — 99214 OFFICE O/P EST MOD 30 MIN: CPT | Mod: 95,CR | Performed by: INTERNAL MEDICINE

## 2020-09-25 ASSESSMENT — FIBROSIS 4 INDEX: FIB4 SCORE: 1.43

## 2020-09-25 NOTE — PROGRESS NOTES
CC: Sleep apnea hypopnea syndrome.    HPI:   Mr. Sarmiento is initially seen here on May 6, 2020. This evaluation was conducted via telemedicine using secure encrypted software the [employer] platform.  The patient was physically located in his home and the physician was located in the [facility] sleep center in [facility]. The patient's identity was confirmed and verbal consent for the telemedicine interview was obtained.    He had been followed at [facility] since 2015 and treated with nocturnal CPAP.  He noted a dramatic improvement in daytime somnolence on CPAP therapy using DreamWear nasal pillows.    In recent years he has developed recurrence of his previous chronic nasal congestion.  He is doing better now with a nasal mask.  He is using the CPAP and nasal mask each night, throughout the night.  He is not having usual problems with mask fit, leakage or airway dryness.  He enjoys normal levels of daytime alertness.  He does nap periodically for about 20 minutes but otherwise does not fall asleep inappropriately.  He is not snoring on CPAP and no longer has the morning headaches that he had before treatment.    The CPAP data recording information is reviewed with the patient.  It demonstrates use on each of the last 30 days with an average daily usage just under 7 hours.  Leak is modest and the estimated residual apnea hypopnea index is 5.6 events per hour.        Patient Active Problem List    Diagnosis Date Noted   • Decreased hearing, left 02/18/2020   • Medicare annual wellness visit, initial 02/15/2020   • Anemia, unspecified 02/15/2020   • Decreased GFR 02/15/2020   • Dyslipidemia 02/04/2020   • Hypovitaminosis D 02/04/2020   • Overweight 02/04/2020   • Actinic keratosis 02/04/2020   • KORIN on CPAP 02/04/2020   • Health care maintenance 02/04/2020       Past Medical History:   Diagnosis Date   • Actinic keratosis    • Chickenpox    • Dyslipidemia    • Serbian measles    • Sleep apnea        Past Surgical History:    Procedure Laterality Date   • TONSILLECTOMY         Family History   Problem Relation Age of Onset   • Heart Disease Mother    • Cancer Father         colon   • Diabetes Sister    • Hypertension Sister    • Hyperlipidemia Sister    • Sleep Apnea Sister    • Diabetes Brother    • Hypertension Brother    • Hyperlipidemia Brother    • Sleep Apnea Brother        Social History     Socioeconomic History   • Marital status:      Spouse name: Not on file   • Number of children: Not on file   • Years of education: Not on file   • Highest education level: Not on file   Occupational History   • Not on file   Social Needs   • Financial resource strain: Not on file   • Food insecurity     Worry: Not on file     Inability: Not on file   • Transportation needs     Medical: Not on file     Non-medical: Not on file   Tobacco Use   • Smoking status: Never Smoker   • Smokeless tobacco: Never Used   Substance and Sexual Activity   • Alcohol use: Yes     Frequency: 4 or more times a week     Drinks per session: 1 or 2     Binge frequency: Never   • Drug use: Never   • Sexual activity: Not on file   Lifestyle   • Physical activity     Days per week: Not on file     Minutes per session: Not on file   • Stress: Not on file   Relationships   • Social connections     Talks on phone: Not on file     Gets together: Not on file     Attends Yazidism service: Not on file     Active member of club or organization: Not on file     Attends meetings of clubs or organizations: Not on file     Relationship status: Not on file   • Intimate partner violence     Fear of current or ex partner: Not on file     Emotionally abused: Not on file     Physically abused: Not on file     Forced sexual activity: Not on file   Other Topics Concern   • Not on file   Social History Narrative   • Not on file       Current Outpatient Medications   Medication Sig Dispense Refill   • Cholecalciferol (VITAMIN D3) 50 MCG (2000 UT) Tab Take  by mouth.       No  "current facility-administered medications for this visit.     \"CURRENT RX\"      Allergies: Patient has no known allergies.      ROS  Unchanged from prior and positive for the sleep issues reviewed above as well as the items in the problem list and past medical history.  All other aspects of the CPT review of systems process are negative.      Physical Exam:   Ht 1.727 m (5' 8\")   Wt 79.8 kg (176 lb)   BMI 26.76 kg/m²    Limited by the telemedicine interface.  He is a well-developed, well-nourished man who is alert, oriented and appropriately responsive.  He is not dyspneic and is not coughing.      Problems:  1. Obstructive sleep apnea syndrome  He has significant obstructive sleep apnea hypopnea syndrome, confirmed by polysomnography.  He is doing well on nocturnal CPAP.  He is using the machine each night, throughout the night, as documented by the data recorder.  He enjoys reasonable levels of daytime alertness and has an acceptable apnea hypotony index of 5.6 events per hour.  He is benefiting from treatment.    2. Hypersomnolence  Improved on CPAP therapy.    3.  Chronic nasal congestion  This is a barrier to his use of the CPAP.  He has seen Dr. Poppy Villanueva in the past and will ask for follow-up evaluation in that office.      Plan:   1.  Continue auto titrating CPAP at 5 to 15 cm of water pressure.    2.  Referral for follow-up with Dr. Poppy Villanueva, ENT.    3.  Return visit here in a year, or sooner if new problems develop.  He will message us with any issues before then.    We appreciate the opportunity to assist in his care.    Total time for the visit today was 25 minutes.  More than half of that time was devoted to counseling the patient concerning the mechanics of sleep disordered breathing, the benefits of treatment in  improving daytime sleepiness and reducing the risk of future cardiac and neurologic events and in discussing treatment options.  "

## 2020-10-15 ENCOUNTER — NON-PROVIDER VISIT (OUTPATIENT)
Dept: MEDICAL GROUP | Age: 65
End: 2020-10-15
Payer: MEDICARE

## 2020-10-15 DIAGNOSIS — Z23 NEED FOR VACCINATION: ICD-10-CM

## 2020-10-16 ENCOUNTER — NON-PROVIDER VISIT (OUTPATIENT)
Dept: MEDICAL GROUP | Age: 65
End: 2020-10-16
Payer: MEDICARE

## 2020-10-16 PROCEDURE — G0008 ADMIN INFLUENZA VIRUS VAC: HCPCS | Performed by: INTERNAL MEDICINE

## 2020-10-16 PROCEDURE — 90662 IIV NO PRSV INCREASED AG IM: CPT | Performed by: INTERNAL MEDICINE

## 2020-10-16 NOTE — PROGRESS NOTES
"Hussein Sarmiento Jr. is a 65 y.o. male here for a non-provider visit for:   FLU    Reason for immunization: Annual Flu Vaccine  Immunization records indicate need for vaccine: Yes, confirmed with Epic  Minimum interval has been met for this vaccine: Yes  ABN completed: Not Indicated    Order and dose verified by: michelle HILL Dated   was given to patient: Yes  All IAC Questionnaire questions were answered \"No.\"    Patient tolerated injection and no adverse effects were observed or reported: Yes    Pt scheduled for next dose in series: No  "

## 2020-10-17 ENCOUNTER — HOSPITAL ENCOUNTER (OUTPATIENT)
Dept: LAB | Facility: MEDICAL CENTER | Age: 65
End: 2020-10-17
Attending: INTERNAL MEDICINE
Payer: MEDICARE

## 2020-10-17 DIAGNOSIS — R94.4 DECREASED GFR: ICD-10-CM

## 2020-10-17 DIAGNOSIS — E55.9 HYPOVITAMINOSIS D: ICD-10-CM

## 2020-10-17 DIAGNOSIS — D64.9 ANEMIA, UNSPECIFIED TYPE: ICD-10-CM

## 2020-10-17 DIAGNOSIS — E78.5 DYSLIPIDEMIA: ICD-10-CM

## 2020-10-17 LAB
25(OH)D3 SERPL-MCNC: 43 NG/ML (ref 30–100)
ALBUMIN SERPL BCP-MCNC: 4.4 G/DL (ref 3.2–4.9)
ALBUMIN/GLOB SERPL: 2 G/DL
ALP SERPL-CCNC: 60 U/L (ref 30–99)
ALT SERPL-CCNC: 23 U/L (ref 2–50)
ANION GAP SERPL CALC-SCNC: 11 MMOL/L (ref 7–16)
AST SERPL-CCNC: 24 U/L (ref 12–45)
BASOPHILS # BLD AUTO: 0.4 % (ref 0–1.8)
BASOPHILS # BLD: 0.03 K/UL (ref 0–0.12)
BILIRUB SERPL-MCNC: 0.5 MG/DL (ref 0.1–1.5)
BUN SERPL-MCNC: 16 MG/DL (ref 8–22)
CALCIUM SERPL-MCNC: 9 MG/DL (ref 8.5–10.5)
CHLORIDE SERPL-SCNC: 103 MMOL/L (ref 96–112)
CHOLEST SERPL-MCNC: 176 MG/DL (ref 100–199)
CO2 SERPL-SCNC: 24 MMOL/L (ref 20–33)
CREAT SERPL-MCNC: 1.43 MG/DL (ref 0.5–1.4)
EOSINOPHIL # BLD AUTO: 0.12 K/UL (ref 0–0.51)
EOSINOPHIL NFR BLD: 1.6 % (ref 0–6.9)
ERYTHROCYTE [DISTWIDTH] IN BLOOD BY AUTOMATED COUNT: 44.5 FL (ref 35.9–50)
FASTING STATUS PATIENT QL REPORTED: NORMAL
GLOBULIN SER CALC-MCNC: 2.2 G/DL (ref 1.9–3.5)
GLUCOSE SERPL-MCNC: 101 MG/DL (ref 65–99)
HCT VFR BLD AUTO: 41.2 % (ref 42–52)
HDLC SERPL-MCNC: 48 MG/DL
HGB BLD-MCNC: 13.8 G/DL (ref 14–18)
IMM GRANULOCYTES # BLD AUTO: 0.02 K/UL (ref 0–0.11)
IMM GRANULOCYTES NFR BLD AUTO: 0.3 % (ref 0–0.9)
IRON SATN MFR SERPL: 29 % (ref 15–55)
IRON SERPL-MCNC: 91 UG/DL (ref 50–180)
LDLC SERPL CALC-MCNC: 90 MG/DL
LYMPHOCYTES # BLD AUTO: 2.08 K/UL (ref 1–4.8)
LYMPHOCYTES NFR BLD: 28.5 % (ref 22–41)
MCH RBC QN AUTO: 31.9 PG (ref 27–33)
MCHC RBC AUTO-ENTMCNC: 33.5 G/DL (ref 33.7–35.3)
MCV RBC AUTO: 95.4 FL (ref 81.4–97.8)
MONOCYTES # BLD AUTO: 0.55 K/UL (ref 0–0.85)
MONOCYTES NFR BLD AUTO: 7.5 % (ref 0–13.4)
NEUTROPHILS # BLD AUTO: 4.5 K/UL (ref 1.82–7.42)
NEUTROPHILS NFR BLD: 61.7 % (ref 44–72)
NRBC # BLD AUTO: 0 K/UL
NRBC BLD-RTO: 0 /100 WBC
PLATELET # BLD AUTO: 243 K/UL (ref 164–446)
PMV BLD AUTO: 9.9 FL (ref 9–12.9)
POTASSIUM SERPL-SCNC: 4.1 MMOL/L (ref 3.6–5.5)
PROT SERPL-MCNC: 6.6 G/DL (ref 6–8.2)
RBC # BLD AUTO: 4.32 M/UL (ref 4.7–6.1)
SODIUM SERPL-SCNC: 138 MMOL/L (ref 135–145)
TIBC SERPL-MCNC: 313 UG/DL (ref 250–450)
TRIGL SERPL-MCNC: 192 MG/DL (ref 0–149)
UIBC SERPL-MCNC: 222 UG/DL (ref 110–370)
WBC # BLD AUTO: 7.3 K/UL (ref 4.8–10.8)

## 2020-10-17 PROCEDURE — 83540 ASSAY OF IRON: CPT

## 2020-10-17 PROCEDURE — 85025 COMPLETE CBC W/AUTO DIFF WBC: CPT

## 2020-10-17 PROCEDURE — 80053 COMPREHEN METABOLIC PANEL: CPT

## 2020-10-17 PROCEDURE — 82306 VITAMIN D 25 HYDROXY: CPT

## 2020-10-17 PROCEDURE — 80061 LIPID PANEL: CPT

## 2020-10-17 PROCEDURE — 36415 COLL VENOUS BLD VENIPUNCTURE: CPT

## 2020-10-17 PROCEDURE — 83550 IRON BINDING TEST: CPT

## 2020-10-28 ENCOUNTER — OFFICE VISIT (OUTPATIENT)
Dept: DERMATOLOGY | Facility: IMAGING CENTER | Age: 65
End: 2020-10-28
Payer: MEDICARE

## 2020-10-28 DIAGNOSIS — L57.0 ACTINIC KERATOSES: ICD-10-CM

## 2020-10-28 DIAGNOSIS — L81.4 LENTIGINES: ICD-10-CM

## 2020-10-28 PROCEDURE — 17000 DESTRUCT PREMALG LESION: CPT | Performed by: NURSE PRACTITIONER

## 2020-10-28 RX ORDER — FLUOROURACIL 50 MG/G
CREAM TOPICAL
Qty: 40 G | Refills: 1 | Status: SHIPPED | OUTPATIENT
Start: 2020-10-28 | End: 2021-03-16

## 2020-10-28 ASSESSMENT — ENCOUNTER SYMPTOMS
FEVER: 0
CHILLS: 0

## 2020-10-28 NOTE — PROGRESS NOTES
Dermatology Return Patient Visit    Chief Complaint   Patient presents with   • Follow-Up     spot check   • Actinic Keratosis       Subjective:     HPI:   Hussein Sarmiento Jr. is a 65 y.o. male presenting for    Here today for a spot check for AK on right side of forehead. Feels like spot is essentially gone.   Wants to ask about brown spots along right temple.        History:  Patient completed course of Efudex 5% cream   applied to face and forehead 03/17/20- 04/28/20 ,      History of skin cancer: No  History of precancers/actinic keratoses: Yes, Details: AK's  History of biopsies:No  History of blistering/severe sunburns:Yes, Details: teenager  Family history of skin cancer:No  Family history of atypical moles:No            Past Medical History:   Diagnosis Date   • Actinic keratosis    • Chickenpox    • Dyslipidemia    • Albanian measles    • Sleep apnea        Current Outpatient Medications on File Prior to Visit   Medication Sig Dispense Refill   • Cholecalciferol (VITAMIN D3) 50 MCG (2000 UT) Tab Take  by mouth.       No current facility-administered medications on file prior to visit.        No Known Allergies    Family History   Problem Relation Age of Onset   • Heart Disease Mother    • Cancer Father         colon   • Diabetes Sister    • Hypertension Sister    • Hyperlipidemia Sister    • Sleep Apnea Sister    • Diabetes Brother    • Hypertension Brother    • Hyperlipidemia Brother    • Sleep Apnea Brother        Social History     Socioeconomic History   • Marital status:      Spouse name: Not on file   • Number of children: Not on file   • Years of education: Not on file   • Highest education level: Not on file   Occupational History   • Not on file   Social Needs   • Financial resource strain: Not on file   • Food insecurity     Worry: Not on file     Inability: Not on file   • Transportation needs     Medical: Not on file     Non-medical: Not on file   Tobacco Use   • Smoking status:  Never Smoker   • Smokeless tobacco: Never Used   Substance and Sexual Activity   • Alcohol use: Yes     Frequency: 4 or more times a week     Drinks per session: 1 or 2     Binge frequency: Never   • Drug use: Never   • Sexual activity: Not on file   Lifestyle   • Physical activity     Days per week: Not on file     Minutes per session: Not on file   • Stress: Not on file   Relationships   • Social connections     Talks on phone: Not on file     Gets together: Not on file     Attends [employer] service: Not on file     Active member of club or organization: Not on file     Attends meetings of clubs or organizations: Not on file     Relationship status: Not on file   • Intimate partner violence     Fear of current or ex partner: Not on file     Emotionally abused: Not on file     Physically abused: Not on file     Forced sexual activity: Not on file   Other Topics Concern   • Not on file   Social History Narrative   • Not on file       Review of Systems   Constitutional: Negative for chills and fever.   Skin: Negative for itching and rash.        Objective:     A focused cutaneous exam was completed including: face  with the following pertinent findings listed below. Remaining above-listed examined areas within normal limits / negative for rashes or lesions.    There were no vitals taken for this visit.    Physical Exam   Constitutional: He is oriented to person, place, and time and well-developed, well-nourished, and in no distress. No distress.   HENT:   Head: Normocephalic.       Several scattered tan and light brown macules over forehead and temples         Pulmonary/Chest: Effort normal.   Neurological: He is alert and oriented to person, place, and time.   Skin: Skin is warm and dry. No rash noted. There is erythema.                  Psychiatric: Mood and affect normal.   Vitals reviewed.      DATA: none applicable to review    Assessment and Plan:     1. Actinic keratoses  CRYOTHERAPY:  Risks (including, but  not limited to: hypo or hyperpigmentation, redness, blister, blood blister, recurrence, need for further treatment, infection, scar) and benefits of cryotherapy discussed. Patient verbally agreed to proceed with treatment. 2 cryotherapy freeze thaw cycles of 3 seconds were applied to 1 lesion on forehead with cryac. Patient tolerated procedure well. Aftercare instructions given.    Plan for repeat efudex in winter months.   Refill sent to pharmacy on file.     2. Lentigines-forehead  - Discussed benign nature of lesions, related to sun exposure  - Discussed sun protection, hand out provided  -pt purchased skin brightening pads today 4%hydroquinone  -pt might choose to consult with laser provider for BLL        Followup: Return for already scheduled for TSEin December. sooner for any concerns.    COBY Baker.

## 2020-10-29 ENCOUNTER — TELEMEDICINE (OUTPATIENT)
Dept: MEDICAL GROUP | Age: 65
End: 2020-10-29
Payer: MEDICARE

## 2020-10-29 VITALS — WEIGHT: 179 LBS | TEMPERATURE: 97.9 F | BODY MASS INDEX: 27.13 KG/M2 | HEIGHT: 68 IN

## 2020-10-29 DIAGNOSIS — E55.9 HYPOVITAMINOSIS D: ICD-10-CM

## 2020-10-29 DIAGNOSIS — N18.30 STAGE 3 CHRONIC KIDNEY DISEASE, UNSPECIFIED WHETHER STAGE 3A OR 3B CKD: ICD-10-CM

## 2020-10-29 DIAGNOSIS — R73.01 IFG (IMPAIRED FASTING GLUCOSE): ICD-10-CM

## 2020-10-29 DIAGNOSIS — E78.5 DYSLIPIDEMIA: ICD-10-CM

## 2020-10-29 DIAGNOSIS — D64.9 ANEMIA, UNSPECIFIED TYPE: ICD-10-CM

## 2020-10-29 PROBLEM — R94.4 DECREASED GFR: Status: RESOLVED | Noted: 2020-02-15 | Resolved: 2020-10-29

## 2020-10-29 PROCEDURE — 99214 OFFICE O/P EST MOD 30 MIN: CPT | Mod: 95,CR | Performed by: INTERNAL MEDICINE

## 2020-10-29 ASSESSMENT — FIBROSIS 4 INDEX: FIB4 SCORE: 1.34

## 2020-10-29 NOTE — PROGRESS NOTES
Telemedicine Visit: Established Patient     This Remote Face to Face encounter was conducted via Zoom. Given the importance of social distancing and other strategies recommended to reduce the risk of COVID-19 transmission, I am providing medical care to this patient via audio/video visit in place of an in person visit at the request of the patient. Verbal consent to telehealth, risks, benefits, and consequences were discussed. Patient retains the right to withdraw at any time. All existing confidentiality protections apply. The patient has access to all transmitted medical information. No dissemination of any patient images or information to other entities without further written consent.    CHIEF COMPLAINT     Chief Complaint   Patient presents with   • Lab Results   CKD    HPI  Hussein Sarmiento Jr. is a 65 y.o. male who presents today for the following     CKD stage III  The patient had slightly decreased GFR x > 90 days, with normal electrolytes and creatinine.  Fluid intake varied, probably insufficient.  No significant NSAIDs use.     Dyslipidemia  The patient had abnormal lipid panel, no medications.  Diet: regular  Exercise: daily  BMI: 26  FH: half-siblings    IFG  The patient had elevated FBG.  No polydipsia, polyphagia, polyuria.  No abdominal pain, weight loss, fatigue.  Diet/exercise/BMI: As above  FH of DM: Siblings    Anemia  The patient has have borderline low RBC count,   - normal               - MCV/RDW              - B12/folate               - FIT test  Denies   - lymphadenopathy, anorexia, weight loss  - fatigue.      Hypovitaminosis D   The patient had low vitamin D level.  Vitamin D supplement: OTC.    Reviewed PMH, PSH, FH, SH, ALL, HCM/IMM, no changes  Reviewed MEDS, no changes    Patient Active Problem List    Diagnosis Date Noted   • Decreased hearing, left 02/18/2020   • Medicare annual wellness visit, initial 02/15/2020   • Anemia, unspecified 02/15/2020   • Decreased GFR 02/15/2020    • Dyslipidemia 2020   • Hypovitaminosis D 2020   • Overweight 2020   • Actinic keratosis 2020   • KORNI on CPAP 2020   • Health care maintenance 2020     CURRENT MEDICATIONS  Current Outpatient Medications   Medication Sig Dispense Refill   • fluorouracil (EFUDEX) 5 % cream Twice daily to area on the forehead and temples for 2-4 weeks (pending response). AVOID eyes 40 g 1   • Cholecalciferol (VITAMIN D3) 50 MCG ( UT) Tab Take  by mouth.       No current facility-administered medications for this visit.      ALLERGIES  Allergies: Patient has no known allergies.  PAST MEDICAL HISTORY  Past Medical History:   Diagnosis Date   • Actinic keratosis    • Chickenpox    • Dyslipidemia    • Qatari measles    • Sleep apnea      SURGICAL HISTORY  He  has a past surgical history that includes tonsillectomy.  SOCIAL HISTORY  Social History     Tobacco Use   • Smoking status: Never Smoker   • Smokeless tobacco: Never Used   Substance Use Topics   • Alcohol use: Yes     Frequency: 4 or more times a week     Drinks per session: 1 or 2     Binge frequency: Never   • Drug use: Never     Social History     Social History Narrative   • Not on file     FAMILY HISTORY  Family History   Problem Relation Age of Onset   • Heart Disease Mother    • Cancer Father         colon   • Diabetes Sister    • Hypertension Sister    • Hyperlipidemia Sister    • Sleep Apnea Sister    • Diabetes Brother    • Hypertension Brother    • Hyperlipidemia Brother    • Sleep Apnea Brother      Family Status   Relation Name Status   • Mo     • Fa     • Sis  Alive   • Bro  Alive       ROS   Constitutional: Negative for fever, chills, fatigue.  HENT: Negative for congestion, sore throat.  Eyes: Negative for vision problems.   Respiratory: Negative for cough, shortness of breath.  Cardiovascular: Negative for chest pain, palpitations.   Gastrointestinal: Negative for heartburn, nausea, abdominal pain.  "  Genitourinary: Negative for dysuria.  Musculoskeletal: Negative for significant myalgia, back and joint pain.   Skin: Negative for rash.   Neuro: Negative for dizziness, weakness and headaches.   Endo/Heme/Allergies: Does not bruise/bleed easily.   Psychiatric/Behavioral: Negative for depression.    Objective   Vitals obtained by patient:  Temperature 36.6 °C (97.9 °F)   Height 1.727 m (5' 8\")   Weight 81.2 kg (179 lb)   Body Mass Index 27.22 kg/m²   Physical Exam:  Constitutional: Alert, no distress, well-groomed.  Skin: No rash in visible areas.  Eye: Round. Conjunctiva clear, lids normal.  ENMT: Lips pink without lesions, good dentition. Phonation normal.  Neck: No visible masses or thyromegaly. Moves freely without pain.  CV: no peripheral cyanosis, tachycardia.  Respiratory: Unlabored respiratory effort, no cough or audible wheezing.  Psych: Alert and oriented x3, normal affect and mood.     Labs     Labs are reviewed and discussed with a patient  Lab Results   Component Value Date/Time    CHOLSTRLTOT 176 10/17/2020 08:05 AM    LDL 90 10/17/2020 08:05 AM    HDL 48 10/17/2020 08:05 AM    TRIGLYCERIDE 192 (H) 10/17/2020 08:05 AM       Lab Results   Component Value Date/Time    SODIUM 138 10/17/2020 08:05 AM    POTASSIUM 4.1 10/17/2020 08:05 AM    CHLORIDE 103 10/17/2020 08:05 AM    CO2 24 10/17/2020 08:05 AM    GLUCOSE 101 (H) 10/17/2020 08:05 AM    BUN 16 10/17/2020 08:05 AM    CREATININE 1.43 (H) 10/17/2020 08:05 AM     Lab Results   Component Value Date/Time    ALKPHOSPHAT 60 10/17/2020 08:05 AM    ASTSGOT 24 10/17/2020 08:05 AM    ALTSGPT 23 10/17/2020 08:05 AM    TBILIRUBIN 0.5 10/17/2020 08:05 AM      Lab Results   Component Value Date/Time    WBC 7.3 10/17/2020 08:05 AM    RBC 4.32 (L) 10/17/2020 08:05 AM    HEMOGLOBIN 13.8 (L) 10/17/2020 08:05 AM    HEMATOCRIT 41.2 (L) 10/17/2020 08:05 AM    MCV 95.4 10/17/2020 08:05 AM    MCH 31.9 10/17/2020 08:05 AM    MCHC 33.5 (L) 10/17/2020 08:05 AM    MPV 9.9 " 10/17/2020 08:05 AM    NEUTSPOLYS 61.70 10/17/2020 08:05 AM    LYMPHOCYTES 28.50 10/17/2020 08:05 AM    MONOCYTES 7.50 10/17/2020 08:05 AM    EOSINOPHILS 1.60 10/17/2020 08:05 AM    BASOPHILS 0.40 10/17/2020 08:05 AM      Imaging      None    Assessment and Plan     Hussein Sarmiento Jr. is a 65 y.o. male    1. Stage 3 chronic kidney disease, unspecified whether stage 3a or 3b CKD  Advised to increase fluid intake, avoid NSAIDs  - Comp Metabolic Panel; Future    2. Dyslipidemia  Borderline, advised low-calorie diet, daily exercise, weight control  - Comp Metabolic Panel; Future  - Lipid Profile; Future    3. IFG (impaired fasting glucose)  Discussed about risk to develop DM.   Advised low carb diet, exercise, watch for WT.   - Comp Metabolic Panel; Future  - HEMOGLOBIN A1C; Future    4. Anemia, unspecified type  B12, folate and FIT tests were negative.  - CBC WITH DIFFERENTIAL; Future    5. Hypovitaminosis D  Improved, continue current supplement  - VITAMIN D,25 HYDROXY; Future    Follow-up: in 6 months and prn

## 2020-12-15 ENCOUNTER — OFFICE VISIT (OUTPATIENT)
Dept: DERMATOLOGY | Facility: IMAGING CENTER | Age: 65
End: 2020-12-15
Payer: MEDICARE

## 2020-12-15 DIAGNOSIS — L57.0 ACTINIC KERATOSES: ICD-10-CM

## 2020-12-15 DIAGNOSIS — L81.4 LENTIGINES: ICD-10-CM

## 2020-12-15 PROCEDURE — 99212 OFFICE O/P EST SF 10 MIN: CPT | Performed by: NURSE PRACTITIONER

## 2020-12-15 ASSESSMENT — ENCOUNTER SYMPTOMS
FEVER: 0
CHILLS: 0

## 2020-12-15 NOTE — PROGRESS NOTES
Dermatology Return Patient Visit    Chief Complaint   Patient presents with   • Establish Care     TR[facility]ITY        Subjective:     HPI:   Hussein Sarmiento Jr. is a 65 y.o. male presenting for    Follow up post Efudex to face and cryo at last visit on 10/28/2020      Patient completed course of Efudex 5% cream   applied to face and forehead 03/17/20- 04/28/20 ,    Using Topix skin Brightening Pads for lentigines on face.   Started 4 days ago.       History of skin cancer: No  History of precancers/actinic keratoses: Yes, Details: AK's  History of biopsies:No  History of blistering/severe sunburns:Yes, Details: teenager  Family history of skin cancer:No  Family history of atypical moles:No            Past Medical History:   Diagnosis Date   • Actinic keratosis    • Chickenpox    • Dyslipidemia    • Georgian measles    • Sleep apnea        Current Outpatient Medications on File Prior to Visit   Medication Sig Dispense Refill   • fluorouracil (EFUDEX) 5 % cream Twice daily to area on the forehead and temples for 2-4 weeks (pending response). AVOID eyes 40 g 1   • Cholecalciferol (VITAMIN D3) 50 MCG (2000 UT) Tab Take  by mouth.       No current facility-administered medications on file prior to visit.        No Known Allergies    Family History   Problem Relation Age of Onset   • Heart Disease Mother    • Cancer Father         colon   • Diabetes Sister    • Hypertension Sister    • Hyperlipidemia Sister    • Sleep Apnea Sister    • Diabetes Brother    • Hypertension Brother    • Hyperlipidemia Brother    • Sleep Apnea Brother        Social History     Socioeconomic History   • Marital status:      Spouse name: Not on file   • Number of children: Not on file   • Years of education: Not on file   • Highest education level: Not on file   Occupational History   • Not on file   Social Needs   • Financial resource strain: Not on file   • Food insecurity     Worry: Not on file     Inability: Not on file   •  Transportation needs     Medical: Not on file     Non-medical: Not on file   Tobacco Use   • Smoking status: Never Smoker   • Smokeless tobacco: Never Used   Substance and Sexual Activity   • Alcohol use: Yes     Frequency: 4 or more times a week     Drinks per session: 1 or 2     Binge frequency: Never   • Drug use: Never   • Sexual activity: Not on file   Lifestyle   • Physical activity     Days per week: Not on file     Minutes per session: Not on file   • Stress: Not on file   Relationships   • Social connections     Talks on phone: Not on file     Gets together: Not on file     Attends Oriental orthodox service: Not on file     Active member of club or organization: Not on file     Attends meetings of clubs or organizations: Not on file     Relationship status: Not on file   • Intimate partner violence     Fear of current or ex partner: Not on file     Emotionally abused: Not on file     Physically abused: Not on file     Forced sexual activity: Not on file   Other Topics Concern   • Not on file   Social History Narrative   • Not on file       Review of Systems   Constitutional: Negative for chills and fever.   Skin: Negative for itching and rash.        Objective:     A focused cutaneous exam was completed including: face  with the following pertinent findings listed below. Remaining above-listed examined areas within normal limits / negative for rashes or lesions.    There were no vitals taken for this visit.    Physical Exam   Constitutional: He is oriented to person, place, and time and well-developed, well-nourished, and in no distress. No distress.   HENT:   Head: Normocephalic.   Several scattered tan and light brown macules over forehead and temples    Face clear of AKs today         Pulmonary/Chest: Effort normal.   Neurological: He is alert and oriented to person, place, and time.   Skin: Skin is warm and dry. No rash noted. No erythema.                  Psychiatric: Mood and affect normal.   Vitals  reviewed.      DATA: none applicable to review    Assessment and Plan:     1. Actinic keratoses-resolved  Advised to monitor for any changes and RTC for recurrence  Sun protection  TRINITY in March    2. Lentigines  - Benign-appearing nature of lesions discussed. Advised to return to clinic for any new or concerning changes.  -continue to use Topix Skin Brightening Pads.  -Sun Protections          Followup: Return for March 2021 for TRINITY or sooner for any growing or changing skin lesions.    COBY Baker.           No

## 2021-03-03 DIAGNOSIS — Z23 NEED FOR VACCINATION: ICD-10-CM

## 2021-03-08 ENCOUNTER — HOSPITAL ENCOUNTER (OUTPATIENT)
Dept: LAB | Facility: MEDICAL CENTER | Age: 66
End: 2021-03-08
Attending: INTERNAL MEDICINE
Payer: MEDICARE

## 2021-03-08 DIAGNOSIS — E78.5 DYSLIPIDEMIA: ICD-10-CM

## 2021-03-08 DIAGNOSIS — R73.01 IFG (IMPAIRED FASTING GLUCOSE): ICD-10-CM

## 2021-03-08 DIAGNOSIS — N18.30 STAGE 3 CHRONIC KIDNEY DISEASE, UNSPECIFIED WHETHER STAGE 3A OR 3B CKD: ICD-10-CM

## 2021-03-08 DIAGNOSIS — D64.9 ANEMIA, UNSPECIFIED TYPE: ICD-10-CM

## 2021-03-08 DIAGNOSIS — E55.9 HYPOVITAMINOSIS D: ICD-10-CM

## 2021-03-08 LAB
25(OH)D3 SERPL-MCNC: 46 NG/ML (ref 30–100)
ALBUMIN SERPL BCP-MCNC: 4.1 G/DL (ref 3.2–4.9)
ALBUMIN/GLOB SERPL: 1.6 G/DL
ALP SERPL-CCNC: 59 U/L (ref 30–99)
ALT SERPL-CCNC: 23 U/L (ref 2–50)
ANION GAP SERPL CALC-SCNC: 14 MMOL/L (ref 7–16)
AST SERPL-CCNC: 27 U/L (ref 12–45)
BASOPHILS # BLD AUTO: 0.6 % (ref 0–1.8)
BASOPHILS # BLD: 0.04 K/UL (ref 0–0.12)
BILIRUB SERPL-MCNC: 0.4 MG/DL (ref 0.1–1.5)
BUN SERPL-MCNC: 16 MG/DL (ref 8–22)
CALCIUM SERPL-MCNC: 9.2 MG/DL (ref 8.5–10.5)
CHLORIDE SERPL-SCNC: 107 MMOL/L (ref 96–112)
CHOLEST SERPL-MCNC: 190 MG/DL (ref 100–199)
CO2 SERPL-SCNC: 24 MMOL/L (ref 20–33)
CREAT SERPL-MCNC: 1.15 MG/DL (ref 0.5–1.4)
EOSINOPHIL # BLD AUTO: 0.1 K/UL (ref 0–0.51)
EOSINOPHIL NFR BLD: 1.6 % (ref 0–6.9)
ERYTHROCYTE [DISTWIDTH] IN BLOOD BY AUTOMATED COUNT: 44.6 FL (ref 35.9–50)
EST. AVERAGE GLUCOSE BLD GHB EST-MCNC: 111 MG/DL
FASTING STATUS PATIENT QL REPORTED: NORMAL
GLOBULIN SER CALC-MCNC: 2.6 G/DL (ref 1.9–3.5)
GLUCOSE SERPL-MCNC: 103 MG/DL (ref 65–99)
HBA1C MFR BLD: 5.5 % (ref 4–5.6)
HCT VFR BLD AUTO: 42.3 % (ref 42–52)
HDLC SERPL-MCNC: 49 MG/DL
HGB BLD-MCNC: 13.8 G/DL (ref 14–18)
IMM GRANULOCYTES # BLD AUTO: 0.02 K/UL (ref 0–0.11)
IMM GRANULOCYTES NFR BLD AUTO: 0.3 % (ref 0–0.9)
LDLC SERPL CALC-MCNC: 117 MG/DL
LYMPHOCYTES # BLD AUTO: 2.28 K/UL (ref 1–4.8)
LYMPHOCYTES NFR BLD: 35.7 % (ref 22–41)
MCH RBC QN AUTO: 30.8 PG (ref 27–33)
MCHC RBC AUTO-ENTMCNC: 32.6 G/DL (ref 33.7–35.3)
MCV RBC AUTO: 94.4 FL (ref 81.4–97.8)
MONOCYTES # BLD AUTO: 0.47 K/UL (ref 0–0.85)
MONOCYTES NFR BLD AUTO: 7.4 % (ref 0–13.4)
NEUTROPHILS # BLD AUTO: 3.48 K/UL (ref 1.82–7.42)
NEUTROPHILS NFR BLD: 54.4 % (ref 44–72)
NRBC # BLD AUTO: 0 K/UL
NRBC BLD-RTO: 0 /100 WBC
PLATELET # BLD AUTO: 290 K/UL (ref 164–446)
PMV BLD AUTO: 10.1 FL (ref 9–12.9)
POTASSIUM SERPL-SCNC: 4.2 MMOL/L (ref 3.6–5.5)
PROT SERPL-MCNC: 6.7 G/DL (ref 6–8.2)
RBC # BLD AUTO: 4.48 M/UL (ref 4.7–6.1)
SODIUM SERPL-SCNC: 145 MMOL/L (ref 135–145)
TRIGL SERPL-MCNC: 120 MG/DL (ref 0–149)
WBC # BLD AUTO: 6.4 K/UL (ref 4.8–10.8)

## 2021-03-08 PROCEDURE — 82306 VITAMIN D 25 HYDROXY: CPT

## 2021-03-08 PROCEDURE — 36415 COLL VENOUS BLD VENIPUNCTURE: CPT

## 2021-03-08 PROCEDURE — 80061 LIPID PANEL: CPT

## 2021-03-08 PROCEDURE — 85025 COMPLETE CBC W/AUTO DIFF WBC: CPT

## 2021-03-08 PROCEDURE — 80053 COMPREHEN METABOLIC PANEL: CPT

## 2021-03-08 PROCEDURE — 83036 HEMOGLOBIN GLYCOSYLATED A1C: CPT

## 2021-03-15 ENCOUNTER — OFFICE VISIT (OUTPATIENT)
Dept: DERMATOLOGY | Facility: IMAGING CENTER | Age: 66
End: 2021-03-15
Payer: MEDICARE

## 2021-03-15 DIAGNOSIS — Z12.83 SKIN CANCER SCREENING: ICD-10-CM

## 2021-03-15 DIAGNOSIS — D23.9 DERMATOFIBROMA: ICD-10-CM

## 2021-03-15 DIAGNOSIS — D18.01 CHERRY ANGIOMA: ICD-10-CM

## 2021-03-15 DIAGNOSIS — L57.0 ACTINIC KERATOSES: ICD-10-CM

## 2021-03-15 DIAGNOSIS — D22.9 MULTIPLE NEVI: ICD-10-CM

## 2021-03-15 PROCEDURE — 17000 DESTRUCT PREMALG LESION: CPT | Performed by: NURSE PRACTITIONER

## 2021-03-15 PROCEDURE — 17003 DESTRUCT PREMALG LES 2-14: CPT | Performed by: NURSE PRACTITIONER

## 2021-03-15 PROCEDURE — 99213 OFFICE O/P EST LOW 20 MIN: CPT | Mod: 25 | Performed by: NURSE PRACTITIONER

## 2021-03-15 ASSESSMENT — ENCOUNTER SYMPTOMS
FEVER: 0
CHILLS: 0

## 2021-03-15 NOTE — PROGRESS NOTES
ESTABLISHED PATIENT PRE-VISIT PLANNING     03/15/21@10:10AM Called pt. No answer. Left voice message. Advised virtual visit scheduled for Tuesday, 03/16/21@8:30AM. Advised to download [employer] brittnee. [place] active.    Patient was contacted to complete PVP.     Note: Patient will not be contacted if there is no indication to call.       1.  Reviewed notes from the last few office visits within the medical group: Yes    2.  If any orders were placed at last visit or intended to be done for this visit (i.e. 6 mos follow-up), do we have Results/Consult Notes?         •  Labs - Labs ordered, completed on 03/08/21 and results are in chart.  Note: If patient appointment is for lab review and patient did not complete labs, check with provider if OK to reschedule patient until labs completed.       •  Imaging - Imaging was not ordered at last office visit.       •  Referrals - Referral ordered, patient was seen and consult notes were requested from Dr. Jaylene Villanueva -[facility]. Care Teams updated  YES.   Called & spoke to office of Dr.Stacey Villanueva. Per office pt was seen on 03/2/2021. Records requested at Fax phone number: [phone].    3. Is this appointment scheduled as a Hospital Follow-Up? No    4.  Immunizations were updated in Epic using Reconcile Outside Information activity? Yes    5.  Patient is due for the following Health Maintenance Topics:   Health Maintenance Due   Topic Date Due   • IMM DTaP/Tdap/Td Vaccine (1 - Tdap) Never done   • IMM ZOSTER VACCINES (2 of 3) 04/01/2015   • IMM PNEUMOCOCCAL VACCINE: 65+ Years (1 of 1 - PPSV23) Never done   • Annual Wellness Visit  02/18/2021     6.  AHA (Pulse8) form printed for Provider? Yes

## 2021-03-15 NOTE — LETTER
[facility]  Jessica Loza M.D.  [address] [place] [address]  Fax: [phone]   Authorization for Release/Disclosure of   Protected Health Information   Name: QUYEN SLAUGHTER JR. : 1955 SSN: [id number]   Address: [address] [place], [place] [address] [place] [address] Phone:    [phone] (home)    I authorize the entity listed below to release/disclose the PHI below to:   [facility]/Jessica Loza M.D. and Jessica Loza M.D.   Provider or Entity Name:  Dr. Jaylene Villanueva   Address   [facility]  [address] [address]  [place] [address] Phone:  [phone]    Fax:  [phone]   Reason for request: continuity of care   Information to be released:    [  ] LAST COLONOSCOPY,  including any PATH REPORT and follow-up  [  ] LAST FIT/COLOGUARD RESULT [  ] LAST DEXA  [  ] LAST MAMMOGRAM  [  ] LAST PAP  [  ] LAST LABS [  ] RETINA EXAM REPORT  [  ] IMMUNIZATION RECORDS  [ XXX ] Release all info      [  ] Check here and initial the line next to each item to release ALL health information INCLUDING  _____ Care and treatment for drug and / or alcohol abuse  _____ HIV testing, infection status, or AIDS  _____ Genetic Testing    DATES OF SERVICE OR TIME PERIOD TO BE DISCLOSED: _____________  I understand and acknowledge that:  * This Authorization may be revoked at any time by you in writing, except if your health information has already been used or disclosed.  * Your health information that will be used or disclosed as a result of you signing this authorization could be re-disclosed by the recipient. If this occurs, your re-disclosed health information may no longer be protected by State or Federal laws.  * You may refuse to sign this Authorization. Your refusal will not affect your ability to obtain treatment.  * This Authorization becomes effective upon signing and will  on (date) __________.      If no date is indicated, this Authorization will  one (1) year from the signature date.     Name: Hussein Sarmiento Jr.    Signature: Continuity of Care   Date:     3/15/2021       PLEASE FAX REQUESTED RECORDS BACK TO: [phone]

## 2021-03-15 NOTE — PROGRESS NOTES
Dermatology Return Patient Visit    Chief Complaint   Patient presents with   • Follow-Up     [facility]       Subjective:     HPI:   Hussein Sarmiento Jr. is a 66 y.o. male presenting for    Follow up for TRIN[facility]. No areas of concern today.         Patient completed course of Efudex 5% cream   applied to face and forehead 03/17/20- 04/28/20 ,    Using Topix skin Brightening Pads for lentigines on face.   Started 4 days ago.       History of skin cancer: No  History of precancers/actinic keratoses: Yes, Details: AK's  History of biopsies:No  History of blistering/severe sunburns:Yes, Details: teenager  Family history of skin cancer:No  Family history of atypical moles:No            Past Medical History:   Diagnosis Date   • Actinic keratosis    • Chickenpox    • Dyslipidemia    • Niuean measles    • Sleep apnea        Current Outpatient Medications on File Prior to Visit   Medication Sig Dispense Refill   • Cholecalciferol (VITAMIN D3) 50 MCG (2000 UT) Tab Take  by mouth.     • fluorouracil (EFUDEX) 5 % cream Twice daily to area on the forehead and temples for 2-4 weeks (pending response). AVOID eyes (Patient not taking: Reported on 3/15/2021) 40 g 1     No current facility-administered medications on file prior to visit.       No Known Allergies    Family History   Problem Relation Age of Onset   • Heart Disease Mother    • Cancer Father         colon   • Diabetes Sister    • Hypertension Sister    • Hyperlipidemia Sister    • Sleep Apnea Sister    • Diabetes Brother    • Hypertension Brother    • Hyperlipidemia Brother    • Sleep Apnea Brother        Social History     Socioeconomic History   • Marital status:      Spouse name: Not on file   • Number of children: Not on file   • Years of education: Not on file   • Highest education level: Not on file   Occupational History   • Not on file   Tobacco Use   • Smoking status: Never Smoker   • Smokeless tobacco: Never Used   Substance and Sexual Activity   • Alcohol  use: Yes   • Drug use: Never   • Sexual activity: Not on file   Other Topics Concern   • Not on file   Social History Narrative   • Not on file     Social Determinants of Health     Financial Resource Strain:    • Difficulty of Paying Living Expenses:    Food Insecurity:    • Worried About Running Out of Food in the Last Year:    • Ran Out of Food in the Last Year:    Transportation Needs:    • Lack of Transportation (Medical):    • Lack of Transportation (Non-Medical):    Physical Activity:    • Days of Exercise per Week:    • Minutes of Exercise per Session:    Stress:    • Feeling of Stress :    Social Connections:    • Frequency of Communication with Friends and Family:    • Frequency of Social Gatherings with Friends and Family:    • Attends Taoist Services:    • Active Member of Clubs or Organizations:    • Attends Club or Organization Meetings:    • Marital Status:    Intimate Partner Violence:    • Fear of Current or Ex-Partner:    • Emotionally Abused:    • Physically Abused:    • Sexually Abused:        Review of Systems   Constitutional: Negative for chills and fever.   Skin: Negative for itching and rash.        Objective:     A focused cutaneous exam was completed including: face  with the following pertinent findings listed below. Remaining above-listed examined areas within normal limits / negative for rashes or lesions.    There were no vitals taken for this visit.    Physical Exam   Constitutional: He is oriented to person, place, and time and well-developed, well-nourished, and in no distress. No distress.   HENT:   Head: Normocephalic.   Several scattered tan and light brown macules over forehead and temples    Face clear of AKs today         Pulmonary/Chest: Effort normal.   Neurological: He is alert and oriented to person, place, and time.   Skin: Skin is warm and dry. No rash noted. No erythema.                  Psychiatric: Mood and affect normal.   Vitals reviewed.      DATA: none  applicable to review    Assessment and Plan:     1. Actinic keratoses-resolved  Advised to monitor for any changes and RTC for recurrence  Sun protection  TRINITY in March    2. Lentigines  - Benign-appearing nature of lesions discussed. Advised to return to clinic for any new or concerning changes.  -continue to use Topix Skin Brightening Pads.  -Sun Protections          Followup: No follow-ups on file.    COBY Baker.

## 2021-03-15 NOTE — PROGRESS NOTES
Dermatology Return Patient Visit    Chief Complaint   Patient presents with   • Follow-Up     [facility]       Subjective:     HPI:   Hussein Sarmiento Jr. is a 66 y.o. male presenting for      Follow up for [facility].    No growing, changing, bleeding, itching, skin lesions.    Patient completed course of Efudex 5% cream   applied to face and forehead 03/17/20- 04/28/20 ,    Using Topix skin Brightening Pads for lentigines on face.       History of skin cancer: No  History of precancers/actinic keratoses: Yes, Details: AK's  History of biopsies:No  History of blistering/severe sunburns:Yes, Details: teenager  Family history of skin cancer:No  Family history of atypical moles:No          Past Medical History:   Diagnosis Date   • Actinic keratosis    • Chickenpox    • Dyslipidemia    • [place]ic measles    • Sleep apnea        Current Outpatient Medications on File Prior to Visit   Medication Sig Dispense Refill   • Cholecalciferol (VITAMIN D3) 50 MCG (2000 UT) Tab Take  by mouth.     • fluorouracil (EFUDEX) 5 % cream Twice daily to area on the forehead and temples for 2-4 weeks (pending response). AVOID eyes (Patient not taking: Reported on 3/15/2021) 40 g 1     No current facility-administered medications on file prior to visit.       No Known Allergies    Family History   Problem Relation Age of Onset   • Heart Disease Mother    • Cancer Father         colon   • Diabetes Sister    • Hypertension Sister    • Hyperlipidemia Sister    • Sleep Apnea Sister    • Diabetes Brother    • Hypertension Brother    • Hyperlipidemia Brother    • Sleep Apnea Brother        Social History     Socioeconomic History   • Marital status:      Spouse name: Not on file   • Number of children: Not on file   • Years of education: Not on file   • Highest education level: Not on file   Occupational History   • Not on file   Tobacco Use   • Smoking status: Never Smoker   • Smokeless tobacco: Never Used   Substance and Sexual Activity   •  Alcohol use: Yes   • Drug use: Never   • Sexual activity: Not on file   Other Topics Concern   • Not on file   Social History Narrative   • Not on file     Social Determinants of Health     Financial Resource Strain:    • Difficulty of Paying Living Expenses:    Food Insecurity:    • Worried About Running Out of Food in the Last Year:    • Ran Out of Food in the Last Year:    Transportation Needs:    • Lack of Transportation (Medical):    • Lack of Transportation (Non-Medical):    Physical Activity:    • Days of Exercise per Week:    • Minutes of Exercise per Session:    Stress:    • Feeling of Stress :    Social Connections:    • Frequency of Communication with Friends and Family:    • Frequency of Social Gatherings with Friends and Family:    • Attends Restoration Services:    • Active Member of Clubs or Organizations:    • Attends Club or Organization Meetings:    • Marital Status:    Intimate Partner Violence:    • Fear of Current or Ex-Partner:    • Emotionally Abused:    • Physically Abused:    • Sexually Abused:        Review of Systems   Constitutional: Negative for chills and fever.   Skin: Negative for itching and rash.        Objective:     A full mucocutaneous exam was completed including: scalp, hair, ears, face, eyelids, conjunctiva, lips, gums/tongue/oropharynx, neck, chest, abdomen, back, left and right upper extremities (including hands/digits and fingernails), left and right lower extremities (including feet/toes, toenails), buttocks, excluding external genitalia (patient refusal) with the following pertinent findings listed below. Remaining above-listed examined areas within normal limits / negative for rashes or lesions.    There were no vitals taken for this visit.    Physical Exam   Constitutional: He is oriented to person, place, and time and well-developed, well-nourished, and in no distress. No distress.   HENT:   Head: Normocephalic.   Right Ear: External ear normal.   Left Ear: External ear  normal.   Mouth/Throat: Oropharynx is clear and moist.   Few scattered tan and light brown macules over face    Few medium brown stuck-on waxy papules scattered on the face      Ill-defined erythematous gritty/scaly papules over the forehead, B/L cheeks     Eyes: Conjunctivae are normal.   Pulmonary/Chest: Effort normal.   Musculoskeletal:      Cervical back: Neck supple.   Neurological: He is alert and oriented to person, place, and time.   Skin: Skin is warm and dry. No rash noted. No erythema.   Several scattered tan and light brown macules over trunk and extremities    Several scattered 1-3mm bright red macules and thin papules on the trunk and extremities    Several tan and medium brown stuck-on waxy papules and macules scattered on the trunk and extremities. All with benign-appearing pigment network patterns on dermoscopy    Ill-defined erythematous gritty/scaly papule over the left elbow    1 DF to lateral aspect of left lower leg                 Psychiatric: Mood and affect normal.   Vitals reviewed.      DATA: none applicable to review    Assessment and Plan:     1. Actinic keratoses  CRYOTHERAPY:  Risks (including, but not limited to: hypo or hyperpigmentation, redness, blister, blood blister, recurrence, need for further treatment, infection, scar) and benefits of cryotherapy discussed. Patient verbally agreed to proceed with treatment. 2 cryotherapy freeze thaw cycles of 10 seconds were applied to 4 lesions as noted in excam with cryac. Patient tolerated procedure well. Aftercare instructions given.      2. Multiple nevi  - Benign-appearing nature of lesions discussed. Advised to return to clinic for any new or concerning changes.  - ABCDE's of melanoma discussed      3. Cherry angioma  - Benign-appearing nature of lesions discussed. Advised to return to clinic for any new or concerning changes.      4. Dermatofibroma  - Benign-appearing nature of lesion discussed. Advised to return to clinic for any new  or concerning changes.      5. Skin cancer screening  Skin cancer education  - discussed importance of sun protective clothing, eyewear  - discussed importance of daily use of broad spectrum sunscreen with SPF 30 or greater, as well as need for reapplication ~every 2 hours when exposed to UVR  - discussed importance of regular self-exams, ideally once per month, every 12 months exams in clinic  - ABCDE's of melanoma discussed  - patient to bring any new or concerning lesions to my attention  - Patient educational handout provided and reviewed with patient        I have performed a physical exam and reviewed and updated ROS and Plan today (3/15/2021). In review of dermatology visit (3/11/2020), there are no changes except as documented above.       Followup: Return in about 1 year (around 3/15/2022) for [facility]ITY .    COBY Baker.

## 2021-03-16 ENCOUNTER — TELEMEDICINE (OUTPATIENT)
Dept: MEDICAL GROUP | Age: 66
End: 2021-03-16
Payer: MEDICARE

## 2021-03-16 VITALS — HEIGHT: 68 IN | WEIGHT: 176 LBS | BODY MASS INDEX: 26.67 KG/M2 | TEMPERATURE: 97.8 F

## 2021-03-16 DIAGNOSIS — Z00.00 MEDICARE ANNUAL WELLNESS VISIT, SUBSEQUENT: ICD-10-CM

## 2021-03-16 DIAGNOSIS — G47.33 OSA ON CPAP: ICD-10-CM

## 2021-03-16 DIAGNOSIS — E55.9 HYPOVITAMINOSIS D: ICD-10-CM

## 2021-03-16 DIAGNOSIS — L57.0 ACTINIC KERATOSIS: ICD-10-CM

## 2021-03-16 DIAGNOSIS — E78.5 DYSLIPIDEMIA: ICD-10-CM

## 2021-03-16 DIAGNOSIS — H91.92 DECREASED HEARING, LEFT: ICD-10-CM

## 2021-03-16 DIAGNOSIS — Z00.00 HEALTH CARE MAINTENANCE: ICD-10-CM

## 2021-03-16 DIAGNOSIS — D64.9 ANEMIA, UNSPECIFIED TYPE: ICD-10-CM

## 2021-03-16 PROCEDURE — G0439 PPPS, SUBSEQ VISIT: HCPCS | Mod: 95,CR | Performed by: INTERNAL MEDICINE

## 2021-03-16 ASSESSMENT — FIBROSIS 4 INDEX: FIB4 SCORE: 1.28

## 2021-03-16 ASSESSMENT — ENCOUNTER SYMPTOMS: GENERAL WELL-BEING: EXCELLENT

## 2021-03-16 ASSESSMENT — ACTIVITIES OF DAILY LIVING (ADL): BATHING_REQUIRES_ASSISTANCE: 0

## 2021-03-16 ASSESSMENT — PATIENT HEALTH QUESTIONNAIRE - PHQ9: CLINICAL INTERPRETATION OF PHQ2 SCORE: 0

## 2021-03-16 NOTE — PROGRESS NOTES
Telemedicine Visit: Established Patient     This Remote Face to Face encounter was conducted via [employer]. Given the importance of social distancing and other strategies recommended to reduce the risk of COVID-19 transmission, I am providing medical care to this patient via audio/video visit in place of an in person visit at the request of the patient. Verbal consent to telehealth, risks, benefits, and consequences were discussed. Patient retains the right to withdraw at any time. All existing confidentiality protections apply. The patient has access to all transmitted medical information. No dissemination of any patient images or information to other entities without further written consent.    Chief Complaint   Patient presents with   • Annual Exam   • Lab Results     HPI:  Hussein is a 66 y.o. here for Medicare Annual Wellness Visit    Patient Active Problem List    Diagnosis Date Noted   • Stage 3 chronic kidney disease 10/29/2020   • Decreased hearing, left 02/18/2020   • Medicare annual wellness visit, initial 02/15/2020   • Anemia, unspecified 02/15/2020   • Dyslipidemia 02/04/2020   • Hypovitaminosis D 02/04/2020   • Overweight 02/04/2020   • Actinic keratosis 02/04/2020   • KORIN on CPAP 02/04/2020   • Health care maintenance 02/04/2020       Current Outpatient Medications   Medication Sig Dispense Refill   • Cholecalciferol (VITAMIN D3) 50 MCG (2000 UT) Tab Take  by mouth.     • fluorouracil (EFUDEX) 5 % cream Twice daily to area on the forehead and temples for 2-4 weeks (pending response). AVOID eyes (Patient not taking: Reported on 3/15/2021) 40 g 1     No current facility-administered medications for this visit.      Patient is taking medications as noted in medication list.  Current supplements as per medication list.     Allergies: Patient has no known allergies.    Current social contact/activities: 2 dogs walk a 2 miles, 13 yr old plays with    Is patient current with immunizations? No, due for  PNEUMOVAX (PPSV23), TDAP and SHINGRIX (Shingles). Patient is interested in receiving NONE today.    He  reports that he has never smoked. He has never used smokeless tobacco. He reports current alcohol use. He reports that he does not use drugs.  Counseling given: Not Answered      DPA/Advanced directive: Patient does not have an Advanced Directive.  A packet and workshop information was given on Advanced Directives.    ROS:    Gait: Uses no assistive device   Ostomy: No   Other tubes: No   Amputations: No   Chronic oxygen use No   Last eye exam 2/19/21   Wears hearing aids: No   : Denies any urinary leakage during the last 6 months    Screening:  Depression Screening  Little interest or pleasure in doing things?  0 - not at all  Feeling down, depressed, or hopeless? 0 - not at all  Patient Health Questionnaire Score: 0    Screening for Cognitive Impairment  Three Minute Recall (river, nation, finger)  3/3 River, nation, finger  Jono clock face with all 12 numbers and set the hands to show 10 past 11.  Yes 5/5  If cognitive concerns identified, deferred for follow up unless specifically addressed in assessment and plan.    Fall Risk Assessment  Has the patient had two or more falls in the last year or any fall with injury in the last year?  No  If fall risk identified, deferred for follow up unless specifically addressed in assessment and plan.    Safety Assessment  Throw rugs on floor.  Yes  Handrails on all stairs.  Yes  Good lighting in all hallways.  Yes  Difficulty hearing.  No  Patient counseled about all safety risks that were identified.    Functional Assessment ADLs  Are there any barriers preventing you from cooking for yourself or meeting nutritional needs?  No.    Are there any barriers preventing you from driving safely or obtaining transportation?  No.    Are there any barriers preventing you from using a telephone or calling for help?  No.    Are there any barriers preventing you from shopping?  No.     Are there any barriers preventing you from taking care of your own finances?  No.    Are there any barriers preventing you from managing your medications?  No.    Are there any barriers preventing you from showering, bathing or dressing yourself?  No.    Are you currently engaging in any exercise or physical activity?  Yes.  2 dogs walk a 2 miles, 13 yr old plays with  What is your perception of your health?  Excellent.    Health Maintenance Summary                IMM DTaP/Tdap/Td Vaccine Overdue 1/5/1974     IMM ZOSTER VACCINES Overdue 4/1/2015      Done 2/4/2015 Imm Admin: Zoster Vaccine Live (ZVL) (Zostavax) - HISTORICAL DATA    IMM PNEUMOCOCCAL VACCINE: 65+ Years Overdue 1/5/2020     Annual Wellness Visit Overdue 2/18/2021      Done 2/18/2020 INITIAL ANNUAL WELLNESS VISIT-INCLUDES PPPS ()     Patient has more history with this topic...    COLONOSCOPY Next Due 3/11/2024      Done 3/11/2014 AMB REFERRAL TO GI FOR COLONOSCOPY          Patient Care Team:  Jessica Loza M.D. as PCP - General (Family Medicine)  [facility] as    Preferred (DME Supplier)  Dr. Jaylene Villanueva MD FACS -[facility] as Attending Team Physician (Otolaryngology)    Social History     Tobacco Use   • Smoking status: Never Smoker   • Smokeless tobacco: Never Used   Substance Use Topics   • Alcohol use: Yes     Comment: 2 drinks a day   • Drug use: Never     Family History   Problem Relation Age of Onset   • Heart Disease Mother    • Cancer Father         colon   • Diabetes Sister    • Hypertension Sister    • Hyperlipidemia Sister    • Sleep Apnea Sister    • Diabetes Brother    • Hypertension Brother    • Hyperlipidemia Brother    • Sleep Apnea Brother      He  has a past medical history of Actinic keratosis, Chickenpox, Dyslipidemia, Libyan measles, and Sleep apnea.   Past Surgical History:   Procedure Laterality Date   • TONSILLECTOMY       Exam:   Temp 36.6 °C (97.8 °F)  "(Temporal)   Ht 1.727 m (5' 8\")   Wt 79.8 kg (176 lb)  Body mass index is 26.76 kg/m².  Hearing as above.    Dentition good  Alert, oriented in no acute distress.  Eye contact is good, speech goal directed, affect calm    Labs  Reviewed and discussed  Lab Results   Component Value Date/Time    CHOLSTRLTOT 190 03/08/2021 07:45 AM     (H) 03/08/2021 07:45 AM    HDL 49 03/08/2021 07:45 AM    TRIGLYCERIDE 120 03/08/2021 07:45 AM       Lab Results   Component Value Date/Time    SODIUM 145 03/08/2021 07:45 AM    POTASSIUM 4.2 03/08/2021 07:45 AM    CHLORIDE 107 03/08/2021 07:45 AM    CO2 24 03/08/2021 07:45 AM    GLUCOSE 103 (H) 03/08/2021 07:45 AM    BUN 16 03/08/2021 07:45 AM    CREATININE 1.15 03/08/2021 07:45 AM     Lab Results   Component Value Date/Time    ALKPHOSPHAT 59 03/08/2021 07:45 AM    ASTSGOT 27 03/08/2021 07:45 AM    ALTSGPT 23 03/08/2021 07:45 AM    TBILIRUBIN 0.4 03/08/2021 07:45 AM      Lab Results   Component Value Date/Time    HBA1C 5.5 03/08/2021 07:45 AM     No results found for: TSH  No results found for: FREET4  Lab Results   Component Value Date/Time    WBC 6.4 03/08/2021 07:45 AM    RBC 4.48 (L) 03/08/2021 07:45 AM    HEMOGLOBIN 13.8 (L) 03/08/2021 07:45 AM    HEMATOCRIT 42.3 03/08/2021 07:45 AM    MCV 94.4 03/08/2021 07:45 AM    MCH 30.8 03/08/2021 07:45 AM    MCHC 32.6 (L) 03/08/2021 07:45 AM    MPV 10.1 03/08/2021 07:45 AM    NEUTSPOLYS 54.40 03/08/2021 07:45 AM    LYMPHOCYTES 35.70 03/08/2021 07:45 AM    MONOCYTES 7.40 03/08/2021 07:45 AM    EOSINOPHILS 1.60 03/08/2021 07:45 AM    BASOPHILS 0.60 03/08/2021 07:45 AM      Assessment and Plan.     1. Medicare annual wellness visit, subsequent  Reviewed PMH, PSH, FH, SH, ALL, MEDS, HCM/IMM.   - Subsequent Annual Wellness Visit - Includes PPPS ()    2. Health care maintenance  Per [facility]  - Subsequent Annual Wellness Visit - Includes PPPS ()    3. Dyslipidemia  Borderline, discussed treatment options, patient prefers low-calorie " diet, daily exercise, weight control.  - Subsequent Annual Wellness Visit - Includes PPPS ()  - Comp Metabolic Panel; Future  - Lipid Profile; Future    4. Hypovitaminosis D  Normalized, continue current supplement  - Subsequent Annual Wellness Visit - Includes PPPS ()    5. Anemia, unspecified type  He has a long history of anemia, negative on previous evaluations including recent iron studies  - Subsequent Annual Wellness Visit - Includes PPPS ()  - OCCULT BLOOD FECES IMMUNOASSAY; Future  - CBC WITH DIFFERENTIAL; Standing    6. KORIN on CPAP  Controlled, continue current settings, pulmonology follow-up  - Subsequent Annual Wellness Visit - Includes PPPS ()    7. Actinic keratosis  Advised to avoid sun exposure, use sunscreen  - Subsequent Annual Wellness Visit - Includes PPPS ()    8. Decreased hearing, left  Daily activities are not affected  - Subsequent Annual Wellness Visit - Includes PPPS ()    Services suggested: No services needed at this time  Health Care Screening recommendations as per orders if indicated.  Referrals offered: PT/OT/Nutrition counseling/Behavioral Health/Smoking cessation as per orders if indicated.    Discussion today about general wellness and lifestyle habits:    · Prevent falls and reduce trip hazards; Cautioned about securing or removing rugs.  · Have a working fire alarm and carbon monoxide detector;   · Engage in regular physical activity and social activities     Follow-up: In 1 year and as needed

## 2021-03-30 ENCOUNTER — IMMUNIZATION (OUTPATIENT)
Dept: FAMILY PLANNING/WOMEN'S HEALTH CLINIC | Facility: IMMUNIZATION CENTER | Age: 66
End: 2021-03-30
Attending: INTERNAL MEDICINE
Payer: MEDICARE

## 2021-03-30 DIAGNOSIS — Z23 ENCOUNTER FOR VACCINATION: Primary | ICD-10-CM

## 2021-03-30 DIAGNOSIS — Z23 NEED FOR VACCINATION: ICD-10-CM

## 2021-04-05 PROCEDURE — 91300 PFIZER SARS-COV-2 VACCINE: CPT

## 2021-04-05 PROCEDURE — 0001A PFIZER SARS-COV-2 VACCINE: CPT

## 2021-04-23 ENCOUNTER — IMMUNIZATION (OUTPATIENT)
Dept: FAMILY PLANNING/WOMEN'S HEALTH CLINIC | Facility: IMMUNIZATION CENTER | Age: 66
End: 2021-04-23
Payer: MEDICARE

## 2021-04-23 DIAGNOSIS — Z23 ENCOUNTER FOR VACCINATION: Primary | ICD-10-CM

## 2021-04-23 PROCEDURE — 0002A PFIZER SARS-COV-2 VACCINE: CPT | Performed by: NURSE PRACTITIONER

## 2021-04-23 PROCEDURE — 91300 PFIZER SARS-COV-2 VACCINE: CPT | Performed by: NURSE PRACTITIONER

## 2021-08-23 ENCOUNTER — PATIENT MESSAGE (OUTPATIENT)
Dept: HEALTH INFORMATION MANAGEMENT | Facility: OTHER | Age: 66
End: 2021-08-23

## 2021-09-29 ENCOUNTER — OFFICE VISIT (OUTPATIENT)
Dept: SLEEP MEDICINE | Facility: MEDICAL CENTER | Age: 66
End: 2021-09-29
Payer: MEDICARE

## 2021-09-29 VITALS
WEIGHT: 181 LBS | HEART RATE: 70 BPM | RESPIRATION RATE: 16 BRPM | OXYGEN SATURATION: 95 % | HEIGHT: 68 IN | DIASTOLIC BLOOD PRESSURE: 80 MMHG | BODY MASS INDEX: 27.43 KG/M2 | SYSTOLIC BLOOD PRESSURE: 120 MMHG

## 2021-09-29 DIAGNOSIS — Z00.00 HEALTH CARE MAINTENANCE: ICD-10-CM

## 2021-09-29 DIAGNOSIS — G47.33 OSA ON CPAP: ICD-10-CM

## 2021-09-29 PROCEDURE — 99213 OFFICE O/P EST LOW 20 MIN: CPT | Mod: 25 | Performed by: PREVENTIVE MEDICINE

## 2021-09-29 PROCEDURE — 90662 IIV NO PRSV INCREASED AG IM: CPT | Performed by: PREVENTIVE MEDICINE

## 2021-09-29 PROCEDURE — G0008 ADMIN INFLUENZA VIRUS VAC: HCPCS | Performed by: PREVENTIVE MEDICINE

## 2021-09-29 ASSESSMENT — FIBROSIS 4 INDEX: FIB4 SCORE: 1.28

## 2021-09-29 NOTE — PROGRESS NOTES
"CC: \" Just another annual check in.\"        HPI:  Hussein Sarmiento Jr. is a 66 y.o.male  kindly referred by Jessica Loza M.D.Mr. Sarmiento is initially seen here on May 6, 2020. This evaluation was conducted via telemedicine using secure encrypted software the [employer] platform.     He had been followed at [place] since 2015 and treated with nocturnal CPAP.  He noted a dramatic improvement in daytime somnolence on CPAP therapy using DreamWear nasal pillows.    This patient was last seen by Dr. Servando Gibbons on September 25, 2020 at that time he was 100% compliant using his machine just under 7 hours nightly.  Today his compliance data reveal      COMPLIANCE DATA:  97%  Machine type: ResMed air sense 10 AutoSet  Date range: 8/30/2021 through 9/28/2021  AHI: 4.3  TIME USED: Average 6 hours and 28 minutes  PRESSURE SETTINGS: Min 7, max 15 cm of water  LEAK: Minimal  OTHER: His machine is 1-year-old.    Original sleep study: This patient had his original polysomnogram with PAP titration done at [facility].  The date was February 29, 2016.  In the diagnostic portion of the study he had an AHI of 18.6.  He also had in oxygen perfecto of 79%.  The patient was titrated on CPAP starting at 4 cm water pressure.  The titration continues up to 8 cm water pressure.  His treatment overall HPI was 9.  And his oxygen perfecto was 88% with 0 minutes spent below 88% oxygen saturation.    Past medical history: Dyslipidemia, unspecified anemia, decreased hearing left side, and history of chronic nasal congestion.  Patient Active Problem List    Diagnosis Date Noted   • Stage 3 chronic kidney disease (HCC) 10/29/2020   • Decreased hearing, left 02/18/2020   • Anemia, unspecified 02/15/2020   • Dyslipidemia 02/04/2020   • Hypovitaminosis D 02/04/2020   • Overweight 02/04/2020   • Actinic keratosis 02/04/2020   • KORIN on CPAP 02/04/2020   • Medicare annual wellness visit, subsequent 02/04/2020       Past Medical " History:   Diagnosis Date   • Actinic keratosis    • Chickenpox    • Dyslipidemia    • Turk[place] measles    • Sleep apnea         Past Surgical History:   Procedure Laterality Date   • TONSILLECTOMY         Family History   Problem Relation Age of Onset   • Heart Disease Mother    • Cancer Father         colon   • Diabetes Sister    • Hypertension Sister    • Hyperlipidemia Sister    • Sleep Apnea Sister    • Diabetes Brother    • Hypertension Brother    • Hyperlipidemia Brother    • Sleep Apnea Brother        Social History     Socioeconomic History   • Marital status:      Spouse name: Not on file   • Number of children: Not on file   • Years of education: Not on file   • Highest education level: Not on file   Occupational History   • Not on file   Tobacco Use   • Smoking status: Never Smoker   • Smokeless tobacco: Never Used   Vaping Use   • Vaping Use: Never used   Substance and Sexual Activity   • Alcohol use: Yes     Comment: 2 drinks a day   • Drug use: Never   • Sexual activity: Not on file   Other Topics Concern   • Not on file   Social History Narrative   • Not on file     Social Determinants of Health     Financial Resource Strain:    • Difficulty of Paying Living Expenses:    Food Insecurity:    • Worried About Running Out of Food in the Last Year:    • Ran Out of Food in the Last Year:    Transportation Needs:    • Lack of Transportation (Medical):    • Lack of Transportation (Non-Medical):    Physical Activity:    • Days of Exercise per Week:    • Minutes of Exercise per Session:    Stress:    • Feeling of Stress :    Social Connections:    • Frequency of Communication with Friends and Family:    • Frequency of Social Gatherings with Friends and Family:    • Attends Sikh Services:    • Active Member of Clubs or Organizations:    • Attends Club or Organization Meetings:    • Marital Status:    Intimate Partner Violence:    • Fear of Current or Ex-Partner:    • Emotionally Abused:    •  "Physically Abused:    • Sexually Abused:        Current Outpatient Medications   Medication Sig Dispense Refill   • Cholecalciferol (VITAMIN D3) 50 MCG (2000 UT) Tab Take  by mouth.       No current facility-administered medications for this visit.    \"CURRENT RX\"    ALLERGIES: Patient has no known allergies.    ROS    Constitutional: Denies  weight loss, fatigue  Cardiovascular: Denies chest pain, tightness, palpitations, swelling in legs/feet, difficulty breathing when lying down but gets better when sitting up.   Respiratory: Denies shortness of breath during the day  Sleep: per HPI  Gastrointestinal: Denies  difficulty swallowing, heartburn.  Musculoskeletal: Denies painful joints, sore muscles, back pain.        PHYSICAL EXAM    NECK CIRCUMFERENCE:   /80 (BP Location: Left arm, Patient Position: Sitting, BP Cuff Size: Adult)   Pulse 70   Resp 16   Ht 1.727 m (5' 8\")   Wt 82.1 kg (181 lb)   SpO2 95% Appearance: Well-nourished, looks stated age, no acute distress  Eyes:   EOMI  ENMT: masked  Neck: , trachea midline, no masses  Respiratory effort:  No intercostal retractions or use of accessory muscles  Musculoskeletal:  Grossly normal; gait and station normal; digits and nails normal  Skin:  No cyanosis  Neurologic: oriented to person, time, place, and purpose; judgement intact  Psychiatric:  No depression, anxiety, agitation      Medical Decision Making       The medical record was reviewed in its entirety including the referral notes, records from primary care, consultants notes, hospital records, lab, imaging, microbiology, immunology, and immunizations.  Care gaps identified and reviewed with the patient.    Diagnostic and titration nocturnal polysomnogram's, home sleep apnea tests, continuous nocturnal oximetry results, multiple sleep latency tests, and compliance reports reviewed.    ASSESSMENT:  1. Health care maintenance    - Influenza Vaccine, High Dose (65+ Only)    2. KORIN on CPAP    - DME " Mask and Supplies      PLAN:   Has been advised to continue the current  Pap therapy, clean equipment frequently, and get new mask and supplies as allowed by insurance and DME.  Mask and supplies order will be submitted as soon as possible.      The risks of untreated sleep apnea were discussed with the patient at length. Patients with KORIN are at increased risk of cardiovascular disease including coronary artery disease, systemic arterial hypertension, pulmonary arterial hypertension, cardiac arrhythmias, and stroke. KORIN patients have an increased risk of motor vehicle accidents, type 2 diabetes, chronic kidney disease, and non-alcoholic liver disease. The patient was advised to avoid driving a motor vehicle when drowsy.      Have advised the patient to follow up with the appropriate healthcare practitioners for all other medical problems and issues.      Return to clinic: 1 year for annual visit

## 2022-01-05 ENCOUNTER — HOSPITAL ENCOUNTER (EMERGENCY)
Facility: MEDICAL CENTER | Age: 67
End: 2022-01-05
Attending: EMERGENCY MEDICINE
Payer: MEDICARE

## 2022-01-05 VITALS
OXYGEN SATURATION: 96 % | HEIGHT: 68 IN | SYSTOLIC BLOOD PRESSURE: 125 MMHG | HEART RATE: 83 BPM | BODY MASS INDEX: 28.63 KG/M2 | RESPIRATION RATE: 16 BRPM | DIASTOLIC BLOOD PRESSURE: 82 MMHG | WEIGHT: 188.93 LBS | TEMPERATURE: 97.8 F

## 2022-01-05 DIAGNOSIS — U07.1 COVID-19: ICD-10-CM

## 2022-01-05 DIAGNOSIS — R68.83 CHILLS: ICD-10-CM

## 2022-01-05 LAB
SARS-COV+SARS-COV-2 AG RESP QL IA.RAPID: DETECTED
SPECIMEN SOURCE: ABNORMAL

## 2022-01-05 PROCEDURE — U0005 INFEC AGEN DETEC AMPLI PROBE: HCPCS

## 2022-01-05 PROCEDURE — 99283 EMERGENCY DEPT VISIT LOW MDM: CPT

## 2022-01-05 PROCEDURE — U0003 INFECTIOUS AGENT DETECTION BY NUCLEIC ACID (DNA OR RNA); SEVERE ACUTE RESPIRATORY SYNDROME CORONAVIRUS 2 (SARS-COV-2) (CORONAVIRUS DISEASE [COVID-19]), AMPLIFIED PROBE TECHNIQUE, MAKING USE OF HIGH THROUGHPUT TECHNOLOGIES AS DESCRIBED BY CMS-2020-01-R: HCPCS

## 2022-01-05 PROCEDURE — 87426 SARSCOV CORONAVIRUS AG IA: CPT

## 2022-01-05 ASSESSMENT — FIBROSIS 4 INDEX: FIB4 SCORE: 1.3

## 2022-01-05 ASSESSMENT — PAIN SCALES - WONG BAKER: WONGBAKER_NUMERICALRESPONSE: DOESN'T HURT AT ALL

## 2022-01-05 NOTE — ED NOTES
Discharged in good condition with follow up instructions, pt verbalizes understanding of all, ambulates out with steady gait accompanied by self.  Pt instructed to go to [facility] on [address] to have prescription for Paxlovid filled.

## 2022-01-05 NOTE — ED TRIAGE NOTES
Pt amb to triage c/o generalized body aches, pt requesting covid test as he is going on an airplane tomorrow. Pt son dx'd covid yesterday

## 2022-01-05 NOTE — ED PROVIDER NOTES
"ED Provider Note    Scribed for Vasiliy Ponce M.D. by Suzan Gonzalez. 1/5/2022, 9:15 AM.    Primary care provider: Jessica Loza M.D.  Means of arrival: Walk in  History obtained from: Patient  History limited by: None    CHIEF COMPLAINT  Chief Complaint   Patient presents with    Generalized Body Aches       HPI  Hussein Sarmiento Jr. is a 67 y.o. male who presents to the Emergency Department for body aches last night. He has associated mild cough, phlegm production, and nausea. Denies chest pain, diarrhea, or vomiting. Patients son tested positive for COVID yesterday. He endorses being vaccinated against COVID 19.    REVIEW OF SYSTEMS  Pertinent positives include body aches, mild cough, phlegm production, and nausea. Pertinent negatives include chest pain, diarrhea, or vomiting.    PAST MEDICAL HISTORY   has a past medical history of Actinic keratosis, Chickenpox, Dyslipidemia, Belarusian measles, and Sleep apnea.    SURGICAL HISTORY   has a past surgical history that includes tonsillectomy.    SOCIAL HISTORY  Social History     Tobacco Use    Smoking status: Never Smoker    Smokeless tobacco: Never Used   Vaping Use    Vaping Use: Never used   Substance Use Topics    Alcohol use: Yes     Comment: 2 drinks a day    Drug use: Never      Social History     Substance and Sexual Activity   Drug Use Never       FAMILY HISTORY  Family History   Problem Relation Age of Onset    Heart Disease Mother     Cancer Father         colon    Diabetes Sister     Hypertension Sister     Hyperlipidemia Sister     Sleep Apnea Sister     Diabetes Brother     Hypertension Brother     Hyperlipidemia Brother     Sleep Apnea Brother        CURRENT MEDICATIONS  Home Medications    **Home medications have not yet been reviewed for this encounter**         ALLERGIES  No Known Allergies    PHYSICAL EXAM  VITAL SIGNS: /79   Pulse 95   Temp (!) 35.6 °C (96.1 °F) (Temporal)   Resp 18   Ht 1.727 m (5' 8\")   Wt 85.7 kg " (188 lb 15 oz)   SpO2 95%   BMI 28.73 kg/m²     Pulse ox interpretation: I interpret this pulse ox as normal.  Constitutional: Alert in no apparent distress.  HENT: Normocephalic, Atraumatic, Bilateral external ears normal. Nose normal.   Eyes: Pupils are equal and reactive. Conjunctiva normal, non-icteric.   Heart: Regular rate and rythm, no murmurs.    Lungs: Clear to auscultation bilaterally.  Skin: Warm, Dry, No erythema, No rash.   Neurologic: Alert, Grossly non-focal.   Psychiatric: Affect normal, Judgment normal, Mood normal, Appears appropriate and not intoxicated.     LABS  Results for orders placed or performed during the hospital encounter of 01/05/22   SARS-CoV-2 PCR (24 hour In-House): Collect NP swab in VTM    Specimen: Respirate   Result Value Ref Range    SARS-CoV-2 Source Nasal Swab    SARS-COV Antigen DERIC: Collect dry nasal swab   Result Value Ref Range    SARS-CoV-2 Source Nasal Swab     SARS-COV ANTIGEN DERIC DETECTED (AA) NotDetected       All labs reviewed by me.    COURSE & MEDICAL DECISION MAKING  Nursing notes, VS, PMSFHx reviewed in chart.    9:15 AM - Patient seen and examined at bedside. Patients vitals are stable and he is not hypoxic at this time. I informed the patient that we will test him for COVID today. We will preform both a rapid test and 24 hour test as he would qualify for symptomatic treatment if positive, however the 24 hours test is more accurate. Ordered COVID/SARS and Sars -CoV antigen. Discussed return precautions. Patient will be discharged at this time. He verbalizes agreement with discharge and plan of care.     10:06 AM - I informed the patient that his rapid test was positive. Prescribed Paxlovid. Advised him to stay hydrated and rest as well as isolate at home until 10 days after his symptoms onset. Discussed return precautions. Patient will be discharged at this time. He verbalizes agreement with discharge and plan of care.     Paxlovid EUA progress note    This  patient is considered a candidate for Paxlovid to treat/prevent high risk SARS-CoV-2 virus infection.      Criteria for use (treatment):  -Mild to moderate illness for less than 10 days and is high risk for progressing to severe COVID-19 and/or hospitalization.    -Not hospitalized.   -Does not require oxygen therapy due to COVID-19.    ->40 kg and 12 years old or greater    Criteria for use (post-exposure prophylaxis):  -High risk for progressing to severe COVID-19 and/or hospitalization  -Exposed (close contact per CDC) to COVID-19 positive individual OR high risk of exposure to COVID-19 because of positive individual in institutional setting   -Not fully vaccinated or not expected to mount an adequate immune response due to immunocompromising condition or immunosuppressive medication  ->40 kg and 12 years old or greater    Date of positive COVID-19 test (treatment) or known exposure (post-exposure prophylaxis): 01/05/22     Vaccinated for COVID-19 (yes/no):yes     Symptoms of COVID-19 (if being used for treatment): body aches    High risk criteria:   -Age of 65 or greater  -Body mass index of 25 kg/m2or greater  -12-17 years of age and have  -BMI greater than or equal to 85% of their age and gender based CDC growth chart  -Chronic kidney disease  -Diabetes  -Pregnancy  -Immunosuppressive disease  -Receiving immunosuppressive treatment  -Chronic lung disease  -65 years of age or greater  -Cardiovascular disease  -Hypertension  -Medical related technological dependence  -Sickle cell disease  -Congenital or acquired heart disease  -Neurodevelopmental disorder    This patient has been given the EUA patient fact sheet and consents to receiving this medication.      Medical Decision Making: At this point time I think the patient has Covid19 given his age and the fact that he is unvaccinated I feel that he is high risk for significant complications with this therefore I will go ahead and give him a dose of Regeneron.   He understands that this may or may not help since we do have on the omnicron variant in the community.    The patient will return for new or worsening symptoms and is stable at the time of discharge.    DISPOSITION:  Patient will be discharged home in stable condition.    FOLLOW UP:  [facility]  [address]  [place] [phone]    Go to the ER and tell them you have a prescription for your Covid    Jessica Loza M.D.  [address] [place] [address] [address] [place] [id number] [phone]    Schedule an appointment as soon as possible for a visit in 1 week      OUTPATIENT MEDICATIONS:  Discharge Medication List as of 1/5/2022 10:23 AM        START taking these medications    Details   nirmatrelvir-ritonavir (PAXLOVID) 150-100 mg pack Take 300 mg nirmatrelvir (two 150 mg tablets) with 100 mg  ritonavir (one 100 mg tablet) by mouth, with all three tablets taken together  twice daily for 5 days., Disp-30 Each, R-0, Print Rx Paper            FINAL IMPRESSION  1. Chills    2. COVID-19          Suzan SANABRIA (Scribe), am scribing for, and in the presence of, Vasiliy Ponce M.D.    Electronically signed by: Suzan Gonzalez (Francisco J), 1/5/2022    Vasiliy SANABRIA M.D. personally performed the services described in this documentation, as scribed by Suzan Gonzalez in my presence, and it is both accurate and complete.    The note accurately reflects work and decisions made by me.  Vasiliy Ponce M.D.  1/5/2022  5:03 PM

## 2022-01-05 NOTE — DISCHARGE INSTRUCTIONS
You test positive you will need to remain in home quarantine until all three of the following are true:  You are 5 days from symptom onset  your symptoms are improving   you have been fever free for at least  24 hours without taking any Tylenol or ibuprofen.    If you develop significant shortness of breath, meaning that it is difficult for you to walk even short distances without having to stop and catch your breath, or you become severely dizzy and this is persistent then please return to the emergency department.    I recommend you get a home pulse oximeter.  Return if your oxygenation is less than 90.

## 2022-01-06 LAB
SARS-COV-2 RNA RESP QL NAA+PROBE: DETECTED
SPECIMEN SOURCE: ABNORMAL

## 2022-01-06 NOTE — ED NOTES
"COVID-19 Test Follow-Up  01/06/22    Patient is positive for COVID-19.      SARS-CoV-2 Source   Date Value Ref Range Status   01/05/2022 Nasal Swab  Final     SARS-CoV-2 by PCR   Date Value Ref Range Status   01/05/2022 DETECTED (AA)  Final     Comment:     PATIENTS: Important information regarding your results and instructions can  be found at [email or web address]   \"After your  Covid-19 Test\"    **The Roche SARS-CoV-2 RT-PCR test has been made available for use under the  Emergency Use Authorization (EUA) only.       SARS-COV ANTIGEN DERIC   Date Value Ref Range Status   01/05/2022 DETECTED (AA) NotDetected Corrected     Comment:     A result of Detected does not differentiate between SARS-CoV  and SARS-CoV-2, nor rule out co-infections with other pathogens.    The Quidel Rosi test has been authorized by FDA under an  Emergency Use Authorization (EUA).  Corrected result; previously reported as NotDetected A result of Not-Detected  is presumptive and should be confirmed with  a molecular assay, if necessary for patient management.    The Quidel Rosi test has been authorized by FDA under an  Emergency Use Authorization (EUA). on 01/05/22 at 10:04         Patient received regeneron on 01/05. Patient is not a candidate for paxlovid at this time.     Acacia Vasquez, Pharmacy Intern   Cosigned: Lizzie Degroot, PharmD    "

## 2022-02-24 ENCOUNTER — HOSPITAL ENCOUNTER (OUTPATIENT)
Dept: LAB | Facility: MEDICAL CENTER | Age: 67
End: 2022-02-24
Attending: INTERNAL MEDICINE
Payer: MEDICARE

## 2022-02-24 DIAGNOSIS — E78.5 DYSLIPIDEMIA: ICD-10-CM

## 2022-02-24 DIAGNOSIS — D64.9 ANEMIA, UNSPECIFIED TYPE: ICD-10-CM

## 2022-02-24 LAB
ALBUMIN SERPL BCP-MCNC: 4.4 G/DL (ref 3.2–4.9)
ALBUMIN/GLOB SERPL: 1.8 G/DL
ALP SERPL-CCNC: 62 U/L (ref 30–99)
ALT SERPL-CCNC: 23 U/L (ref 2–50)
ANION GAP SERPL CALC-SCNC: 12 MMOL/L (ref 7–16)
AST SERPL-CCNC: 30 U/L (ref 12–45)
BASOPHILS # BLD AUTO: 0.5 % (ref 0–1.8)
BASOPHILS # BLD: 0.04 K/UL (ref 0–0.12)
BILIRUB SERPL-MCNC: 0.3 MG/DL (ref 0.1–1.5)
BUN SERPL-MCNC: 13 MG/DL (ref 8–22)
CALCIUM SERPL-MCNC: 9.1 MG/DL (ref 8.4–10.2)
CHLORIDE SERPL-SCNC: 102 MMOL/L (ref 96–112)
CHOLEST SERPL-MCNC: 221 MG/DL (ref 100–199)
CO2 SERPL-SCNC: 24 MMOL/L (ref 20–33)
CREAT SERPL-MCNC: 1.33 MG/DL (ref 0.5–1.4)
EOSINOPHIL # BLD AUTO: 0.17 K/UL (ref 0–0.51)
EOSINOPHIL NFR BLD: 2.3 % (ref 0–6.9)
ERYTHROCYTE [DISTWIDTH] IN BLOOD BY AUTOMATED COUNT: 44.7 FL (ref 35.9–50)
FASTING STATUS PATIENT QL REPORTED: NORMAL
GLOBULIN SER CALC-MCNC: 2.4 G/DL (ref 1.9–3.5)
GLUCOSE SERPL-MCNC: 98 MG/DL (ref 65–99)
HCT VFR BLD AUTO: 41.4 % (ref 42–52)
HDLC SERPL-MCNC: 49 MG/DL
HGB BLD-MCNC: 14 G/DL (ref 14–18)
IMM GRANULOCYTES # BLD AUTO: 0.03 K/UL (ref 0–0.11)
IMM GRANULOCYTES NFR BLD AUTO: 0.4 % (ref 0–0.9)
LDLC SERPL CALC-MCNC: 140 MG/DL
LYMPHOCYTES # BLD AUTO: 2.9 K/UL (ref 1–4.8)
LYMPHOCYTES NFR BLD: 39.2 % (ref 22–41)
MCH RBC QN AUTO: 31.6 PG (ref 27–33)
MCHC RBC AUTO-ENTMCNC: 33.8 G/DL (ref 33.7–35.3)
MCV RBC AUTO: 93.5 FL (ref 81.4–97.8)
MONOCYTES # BLD AUTO: 0.47 K/UL (ref 0–0.85)
MONOCYTES NFR BLD AUTO: 6.4 % (ref 0–13.4)
NEUTROPHILS # BLD AUTO: 3.78 K/UL (ref 1.82–7.42)
NEUTROPHILS NFR BLD: 51.2 % (ref 44–72)
NRBC # BLD AUTO: 0 K/UL
NRBC BLD-RTO: 0 /100 WBC
PLATELET # BLD AUTO: 276 K/UL (ref 164–446)
PMV BLD AUTO: 9.8 FL (ref 9–12.9)
POTASSIUM SERPL-SCNC: 4.6 MMOL/L (ref 3.6–5.5)
PROT SERPL-MCNC: 6.8 G/DL (ref 6–8.2)
RBC # BLD AUTO: 4.43 M/UL (ref 4.7–6.1)
SODIUM SERPL-SCNC: 138 MMOL/L (ref 135–145)
TRIGL SERPL-MCNC: 162 MG/DL (ref 0–149)
WBC # BLD AUTO: 7.4 K/UL (ref 4.8–10.8)

## 2022-02-24 PROCEDURE — 80061 LIPID PANEL: CPT

## 2022-02-24 PROCEDURE — 85025 COMPLETE CBC W/AUTO DIFF WBC: CPT

## 2022-02-24 PROCEDURE — 80053 COMPREHEN METABOLIC PANEL: CPT

## 2022-02-24 PROCEDURE — 36415 COLL VENOUS BLD VENIPUNCTURE: CPT

## 2022-02-25 ENCOUNTER — HOSPITAL ENCOUNTER (OUTPATIENT)
Facility: MEDICAL CENTER | Age: 67
End: 2022-02-25
Attending: INTERNAL MEDICINE
Payer: MEDICARE

## 2022-02-25 PROCEDURE — 82274 ASSAY TEST FOR BLOOD FECAL: CPT

## 2022-02-28 DIAGNOSIS — D64.9 ANEMIA, UNSPECIFIED TYPE: ICD-10-CM

## 2022-02-28 SDOH — ECONOMIC STABILITY: TRANSPORTATION INSECURITY
IN THE PAST 12 MONTHS, HAS THE LACK OF TRANSPORTATION KEPT YOU FROM MEDICAL APPOINTMENTS OR FROM GETTING MEDICATIONS?: NO

## 2022-02-28 SDOH — ECONOMIC STABILITY: INCOME INSECURITY: IN THE LAST 12 MONTHS, WAS THERE A TIME WHEN YOU WERE NOT ABLE TO PAY THE MORTGAGE OR RENT ON TIME?: NO

## 2022-02-28 SDOH — ECONOMIC STABILITY: FOOD INSECURITY: WITHIN THE PAST 12 MONTHS, THE FOOD YOU BOUGHT JUST DIDN'T LAST AND YOU DIDN'T HAVE MONEY TO GET MORE.: NEVER TRUE

## 2022-02-28 SDOH — HEALTH STABILITY: PHYSICAL HEALTH: ON AVERAGE, HOW MANY MINUTES DO YOU ENGAGE IN EXERCISE AT THIS LEVEL?: 60 MIN

## 2022-02-28 SDOH — ECONOMIC STABILITY: HOUSING INSECURITY
IN THE LAST 12 MONTHS, WAS THERE A TIME WHEN YOU DID NOT HAVE A STEADY PLACE TO SLEEP OR SLEPT IN A SHELTER (INCLUDING NOW)?: NO

## 2022-02-28 SDOH — ECONOMIC STABILITY: FOOD INSECURITY: WITHIN THE PAST 12 MONTHS, YOU WORRIED THAT YOUR FOOD WOULD RUN OUT BEFORE YOU GOT MONEY TO BUY MORE.: NEVER TRUE

## 2022-02-28 SDOH — ECONOMIC STABILITY: HOUSING INSECURITY: IN THE LAST 12 MONTHS, HOW MANY PLACES HAVE YOU LIVED?: 1

## 2022-02-28 SDOH — ECONOMIC STABILITY: INCOME INSECURITY: HOW HARD IS IT FOR YOU TO PAY FOR THE VERY BASICS LIKE FOOD, HOUSING, MEDICAL CARE, AND HEATING?: NOT HARD AT ALL

## 2022-02-28 SDOH — ECONOMIC STABILITY: TRANSPORTATION INSECURITY
IN THE PAST 12 MONTHS, HAS LACK OF TRANSPORTATION KEPT YOU FROM MEETINGS, WORK, OR FROM GETTING THINGS NEEDED FOR DAILY LIVING?: NO

## 2022-02-28 SDOH — HEALTH STABILITY: PHYSICAL HEALTH: ON AVERAGE, HOW MANY DAYS PER WEEK DO YOU ENGAGE IN MODERATE TO STRENUOUS EXERCISE (LIKE A BRISK WALK)?: 7 DAYS

## 2022-02-28 ASSESSMENT — SOCIAL DETERMINANTS OF HEALTH (SDOH)
IN A TYPICAL WEEK, HOW MANY TIMES DO YOU TALK ON THE PHONE WITH FAMILY, FRIENDS, OR NEIGHBORS?: MORE THAN THREE TIMES A WEEK
HOW OFTEN DO YOU ATTENT MEETINGS OF THE CLUB OR ORGANIZATION YOU BELONG TO?: 1 TO 4 TIMES PER YEAR
DO YOU BELONG TO ANY CLUBS OR ORGANIZATIONS SUCH AS CHURCH GROUPS UNIONS, FRATERNAL OR ATHLETIC GROUPS, OR SCHOOL GROUPS?: NO
HOW OFTEN DO YOU ATTEND CHURCH OR RELIGIOUS SERVICES?: NEVER
HOW OFTEN DO YOU GET TOGETHER WITH FRIENDS OR RELATIVES?: MORE THAN THREE TIMES A WEEK

## 2022-02-28 ASSESSMENT — LIFESTYLE VARIABLES
HOW MANY STANDARD DRINKS CONTAINING ALCOHOL DO YOU HAVE ON A TYPICAL DAY: 1 OR 2
HOW OFTEN DO YOU HAVE A DRINK CONTAINING ALCOHOL: 4 OR MORE TIMES A WEEK
HOW OFTEN DO YOU HAVE SIX OR MORE DRINKS ON ONE OCCASION: NEVER

## 2022-03-01 SDOH — HEALTH STABILITY: PHYSICAL HEALTH: ON AVERAGE, HOW MANY MINUTES DO YOU ENGAGE IN EXERCISE AT THIS LEVEL?: 60 MIN

## 2022-03-01 SDOH — HEALTH STABILITY: MENTAL HEALTH
STRESS IS WHEN SOMEONE FEELS TENSE, NERVOUS, ANXIOUS, OR CAN'T SLEEP AT NIGHT BECAUSE THEIR MIND IS TROUBLED. HOW STRESSED ARE YOU?: NOT AT ALL

## 2022-03-01 SDOH — ECONOMIC STABILITY: HOUSING INSECURITY: IN THE LAST 12 MONTHS, HOW MANY PLACES HAVE YOU LIVED?: 1

## 2022-03-01 SDOH — HEALTH STABILITY: PHYSICAL HEALTH: ON AVERAGE, HOW MANY DAYS PER WEEK DO YOU ENGAGE IN MODERATE TO STRENUOUS EXERCISE (LIKE A BRISK WALK)?: 7 DAYS

## 2022-03-01 SDOH — ECONOMIC STABILITY: TRANSPORTATION INSECURITY
IN THE PAST 12 MONTHS, HAS LACK OF RELIABLE TRANSPORTATION KEPT YOU FROM MEDICAL APPOINTMENTS, MEETINGS, WORK OR FROM GETTING THINGS NEEDED FOR DAILY LIVING?: NO

## 2022-03-01 ASSESSMENT — SOCIAL DETERMINANTS OF HEALTH (SDOH)
HOW OFTEN DO YOU ATTENT MEETINGS OF THE CLUB OR ORGANIZATION YOU BELONG TO?: 1 TO 4 TIMES PER YEAR
DO YOU BELONG TO ANY CLUBS OR ORGANIZATIONS SUCH AS CHURCH GROUPS UNIONS, FRATERNAL OR ATHLETIC GROUPS, OR SCHOOL GROUPS?: NO
HOW OFTEN DO YOU ATTEND CHURCH OR RELIGIOUS SERVICES?: NEVER
IN A TYPICAL WEEK, HOW MANY TIMES DO YOU TALK ON THE PHONE WITH FAMILY, FRIENDS, OR NEIGHBORS?: MORE THAN THREE TIMES A WEEK
HOW MANY DRINKS CONTAINING ALCOHOL DO YOU HAVE ON A TYPICAL DAY WHEN YOU ARE DRINKING: 1 OR 2
HOW OFTEN DO YOU GET TOGETHER WITH FRIENDS OR RELATIVES?: MORE THAN THREE TIMES A WEEK
HOW OFTEN DO YOU HAVE SIX OR MORE DRINKS ON ONE OCCASION: NEVER
HOW OFTEN DO YOU HAVE A DRINK CONTAINING ALCOHOL: 4 OR MORE TIMES A WEEK
HOW HARD IS IT FOR YOU TO PAY FOR THE VERY BASICS LIKE FOOD, HOUSING, MEDICAL CARE, AND HEATING?: NOT HARD AT ALL
WITHIN THE PAST 12 MONTHS, YOU WORRIED THAT YOUR FOOD WOULD RUN OUT BEFORE YOU GOT THE MONEY TO BUY MORE: NEVER TRUE

## 2022-03-02 LAB — IMM ASSAY OCC BLD FITOB: NEGATIVE

## 2022-03-28 ENCOUNTER — TELEMEDICINE (OUTPATIENT)
Dept: MEDICAL GROUP | Age: 67
End: 2022-03-28

## 2022-03-28 ENCOUNTER — OFFICE VISIT (OUTPATIENT)
Dept: DERMATOLOGY | Facility: IMAGING CENTER | Age: 67
End: 2022-03-28
Payer: MEDICARE

## 2022-03-28 VITALS — RESPIRATION RATE: 14 BRPM | TEMPERATURE: 97.3 F | WEIGHT: 181.5 LBS | HEIGHT: 68 IN | BODY MASS INDEX: 27.51 KG/M2

## 2022-03-28 DIAGNOSIS — Z12.5 SCREENING FOR MALIGNANT NEOPLASM OF PROSTATE: ICD-10-CM

## 2022-03-28 DIAGNOSIS — Z00.00 HEALTH CARE MAINTENANCE: ICD-10-CM

## 2022-03-28 DIAGNOSIS — G47.33 OSA ON CPAP: ICD-10-CM

## 2022-03-28 DIAGNOSIS — N18.30 STAGE 3 CHRONIC KIDNEY DISEASE, UNSPECIFIED WHETHER STAGE 3A OR 3B CKD: ICD-10-CM

## 2022-03-28 DIAGNOSIS — Z00.00 MEDICARE ANNUAL WELLNESS VISIT, SUBSEQUENT: ICD-10-CM

## 2022-03-28 DIAGNOSIS — Z12.83 SKIN CANCER SCREENING: ICD-10-CM

## 2022-03-28 DIAGNOSIS — H91.92 DECREASED HEARING, LEFT: ICD-10-CM

## 2022-03-28 DIAGNOSIS — D23.9 DERMATOFIBROMA: ICD-10-CM

## 2022-03-28 DIAGNOSIS — D22.9 MULTIPLE NEVI: ICD-10-CM

## 2022-03-28 DIAGNOSIS — E78.5 DYSLIPIDEMIA: ICD-10-CM

## 2022-03-28 DIAGNOSIS — D18.01 CHERRY ANGIOMA: ICD-10-CM

## 2022-03-28 DIAGNOSIS — L57.0 ACTINIC KERATOSIS: ICD-10-CM

## 2022-03-28 DIAGNOSIS — L82.1 SK (SEBORRHEIC KERATOSIS): ICD-10-CM

## 2022-03-28 DIAGNOSIS — L81.4 LENTIGINES: ICD-10-CM

## 2022-03-28 DIAGNOSIS — L91.8 ACROCHORDON: ICD-10-CM

## 2022-03-28 DIAGNOSIS — D64.9 ANEMIA, UNSPECIFIED TYPE: ICD-10-CM

## 2022-03-28 PROBLEM — E55.9 HYPOVITAMINOSIS D: Status: RESOLVED | Noted: 2020-02-04 | Resolved: 2022-03-28

## 2022-03-28 PROBLEM — E66.3 OVERWEIGHT: Status: RESOLVED | Noted: 2020-02-04 | Resolved: 2022-03-28

## 2022-03-28 PROCEDURE — G0439 PPPS, SUBSEQ VISIT: HCPCS | Mod: 95 | Performed by: INTERNAL MEDICINE

## 2022-03-28 PROCEDURE — 17003 DESTRUCT PREMALG LES 2-14: CPT | Performed by: NURSE PRACTITIONER

## 2022-03-28 PROCEDURE — 99213 OFFICE O/P EST LOW 20 MIN: CPT | Mod: 25 | Performed by: NURSE PRACTITIONER

## 2022-03-28 PROCEDURE — 17000 DESTRUCT PREMALG LESION: CPT | Performed by: NURSE PRACTITIONER

## 2022-03-28 ASSESSMENT — ACTIVITIES OF DAILY LIVING (ADL): BATHING_REQUIRES_ASSISTANCE: 0

## 2022-03-28 ASSESSMENT — FIBROSIS 4 INDEX: FIB4 SCORE: 1.52

## 2022-03-28 ASSESSMENT — ENCOUNTER SYMPTOMS: GENERAL WELL-BEING: EXCELLENT

## 2022-03-28 ASSESSMENT — PATIENT HEALTH QUESTIONNAIRE - PHQ9: CLINICAL INTERPRETATION OF PHQ2 SCORE: 0

## 2022-03-28 NOTE — PROGRESS NOTES
Telemedicine Visit: Established Patient     This Remote Face to Face encounter was conducted via Zoom. Given the importance of social distancing and other strategies recommended to reduce the risk of COVID-19 transmission, I am providing medical care to this patient via audio/video visit in place of an in person visit at the request of the patient. Verbal consent to telehealth, risks, benefits, and consequences were discussed. Patient retains the right to withdraw at any time. All existing confidentiality protections apply. The patient has access to all transmitted medical information. No dissemination of any patient images or information to other entities without further written consent.  CHIEF COMPLAINT      Patient presents with   • Annual Wellness Visit     HPI:  Hussein is a 67 y.o. here for Medicare Annual Wellness Visit    Patient Active Problem List    Diagnosis Date Noted   • Stage 3 chronic kidney disease (HCC) 10/29/2020   • Decreased hearing, left 02/18/2020   • Anemia, unspecified 02/15/2020   • Dyslipidemia 02/04/2020   • Hypovitaminosis D 02/04/2020   • Overweight 02/04/2020   • Actinic keratosis 02/04/2020   • KORIN on CPAP 02/04/2020   • Medicare annual wellness visit, subsequent 02/04/2020     Current Outpatient Medications   Medication Sig Dispense Refill   • Cholecalciferol (VITAMIN D3) 50 MCG (2000 UT) Tab Take  by mouth.     • nirmatrelvir-ritonavir (PAXLOVID) 150-100 mg pack Take 300 mg nirmatrelvir (two 150 mg tablets) with 100 mg  ritonavir (one 100 mg tablet) by mouth, with all three tablets taken together  twice daily for 5 days. 30 Each 0     No current facility-administered medications for this visit.      Patient is taking medications as noted in medication list.  Current supplements as per medication list.     Allergies: Patient has no known allergies.    Current social contact/activities:  Yes    Is patient current with immunizations? No, due for TDAP, SHINGRIX (Shingles) and COVID-19.  Patient is interested in receiving NONE today.    He  reports that he has never smoked. He has never used smokeless tobacco. He reports current alcohol use. He reports that he does not use drugs.  Counseling given: Not Answered    DPA/Advanced directive: Patient has Advanced Directive on file.     ROS:    Gait: Uses no assistive device   Ostomy: No   Other tubes: No   Amputations: No   Chronic oxygen use No   Last eye exam  2/2022  Wears hearing aids: No   : Denies any urinary leakage during the last 6 months      Screening:    Depression Screening  Little interest or pleasure in doing things?  0 - not at all  Feeling down, depressed, or hopeless? 0 - not at all  Patient Health Questionnaire Score: 0    Screening for Cognitive Impairment  Three Minute Recall (daughter, heaven, mountain) 3/3    Jono clock face with all 12 numbers and set the hands to show 10 past 11.  Yes Virtual visit not able to do clock  Cognitive concerns identified deferred for follow up unless specifically addressed in assessment and plan.    Fall Risk Assessment  Has the patient had two or more falls in the last year or any fall with injury in the last year?  No    Safety Assessment  Throw rugs on floor.  No  Handrails on all stairs.  Yes  Good lighting in all hallways.  Yes  Difficulty hearing.  No  Patient counseled about all safety risks that were identified.    Functional Assessment ADLs  Are there any barriers preventing you from cooking for yourself or meeting nutritional needs?  No.    Are there any barriers preventing you from driving safely or obtaining transportation?  No.    Are there any barriers preventing you from using a telephone or calling for help?  No.    Are there any barriers preventing you from shopping?  No.    Are there any barriers preventing you from taking care of your own finances?  No.    Are there any barriers preventing you from managing your medications?  No.    Are there any barriers preventing you from  showering, bathing or dressing yourself?  No.    Are you currently engaging in any exercise or physical activity?  Yes.     What is your perception of your health?  Excellent.    Health Maintenance Summary          Overdue - IMM DTaP/Tdap/Td Vaccine (1 - Tdap) Overdue - never done    No completion history exists for this topic.          Overdue - IMM ZOSTER VACCINES (2 of 3) Overdue since 2015  Imm Admin: Zoster Vaccine Live (ZVL) (Zostavax) - HISTORICAL DATA          Overdue - COVID-19 Vaccine (3 - Booster for Pfizer series) Overdue since 2021  Imm Admin: Pfizer SARS-CoV-2 Vaccine    2021  Imm Admin: Pfizer SARS-CoV-2 Vaccine          Overdue - Annual Wellness Visit (Every 366 Days) Overdue since 3/17/2022    2021  Prob Dx: Medicare annual wellness visit, initial    2021  Subsequent Annual Wellness Visit - Includes PPPS ()    2021  Visit Dx: Medicare annual wellness visit, subsequent    2020  Visit Dx: Medicare annual wellness visit, initial    2020  Initial Annual Wellness Visit - Includes PPPS ()    Only the first 5 history entries have been loaded, but more history exists.          Postponed - IMM PNEUMOCOCCAL VACCINE: 65+ Years (1 of 1 - PPSV23) Postponed until 10/5/2022    No completion history exists for this topic.          COLORECTAL CANCER SCREENING (COLONOSCOPY - Every 10 Years) Next due on 3/11/2024    2022  OCCULT BLOOD FECES IMMUNOASSAY    2020  OCCULT BLOOD FECES IMMUNOASSAY    2014  AMB REFERRAL TO GI FOR COLONOSCOPY          HEPATITIS C SCREENING  Completed    2020  HCV Scrn ( [id number] 1xLife)          IMM INFLUENZA (Series Information) Completed    2021  Imm Admin: Influenza Vaccine Adult HD    10/16/2020  Imm Admin: Influenza Vaccine Adult HD    2020  Imm Admin: Influenza Vaccine Adult HD    11/10/2015  Imm Admin: Influenza Vaccine Quad Inj (Pf)    2013  Imm Admin:  "Influenza Seasonal Injectable - Historical Data    Only the first 5 history entries have been loaded, but more history exists.          IMM HEP B VACCINE (Series Information) Aged Out    No completion history exists for this topic.          IMM MENINGOCOCCAL VACCINE (MCV4) (Series Information) Aged Out    No completion history exists for this topic.                Patient Care Team:  Jessica Loza M.D. as PCP - General (Family Medicine)  Jessica Loza M.D. as PCP - [facility] Paneled  [facility] as    Preferred (DME Supplier)  Dr. Jaylene Villanueva MD [facility] as Attending Team Physician (Otolaryngology)    Social History     Tobacco Use   • Smoking status: Never Smoker   • Smokeless tobacco: Never Used   Vaping Use   • Vaping Use: Never used   Substance Use Topics   • Alcohol use: Yes     Comment: 2 drinks a day   • Drug use: Never     Family History   Problem Relation Age of Onset   • Heart Disease Mother    • Cancer Father         colon   • Diabetes Sister    • Hypertension Sister    • Hyperlipidemia Sister    • Sleep Apnea Sister    • Diabetes Brother    • Hypertension Brother    • Hyperlipidemia Brother    • Sleep Apnea Brother      He  has a past medical history of Actinic keratosis, Chickenpox, Dyslipidemia, Indian measles, and Sleep apnea.   Past Surgical History:   Procedure Laterality Date   • TONSILLECTOMY       Exam:   Temp 36.3 °C (97.3 °F) (Temporal)   Resp 14   Ht 1.727 m (5' 8\")   Wt 82.3 kg (181 lb 8 oz)  Body mass index is 27.6 kg/m².  Hearing decreased left.    Dentition good  Alert, oriented in no acute distress.  Eye contact is good, speech goal directed, affect calm    Labs reviewed and discussed  Lab Results   Component Value Date/Time    CHOLSTRLTOT 221 (H) 02/24/2022 09:47 AM     (H) 02/24/2022 09:47 AM    HDL 49 02/24/2022 09:47 AM    TRIGLYCERIDE 162 (H) 02/24/2022 09:47 AM       Lab Results   Component Value " Date/Time    SODIUM 138 02/24/2022 09:47 AM    POTASSIUM 4.6 02/24/2022 09:47 AM    CHLORIDE 102 02/24/2022 09:47 AM    CO2 24 02/24/2022 09:47 AM    GLUCOSE 98 02/24/2022 09:47 AM    BUN 13 02/24/2022 09:47 AM    CREATININE 1.33 02/24/2022 09:47 AM     Lab Results   Component Value Date/Time    ALKPHOSPHAT 62 02/24/2022 09:47 AM    ASTSGOT 30 02/24/2022 09:47 AM    ALTSGPT 23 02/24/2022 09:47 AM    TBILIRUBIN 0.3 02/24/2022 09:47 AM      Lab Results   Component Value Date/Time    HBA1C 5.5 03/08/2021 07:45 AM     No results found for: TSH  No results found for: FREET4  Lab Results   Component Value Date/Time    WBC 7.4 02/24/2022 09:47 AM    RBC 4.43 (L) 02/24/2022 09:47 AM    HEMOGLOBIN 14.0 02/24/2022 09:47 AM    HEMATOCRIT 41.4 (L) 02/24/2022 09:47 AM    MCV 93.5 02/24/2022 09:47 AM    MCH 31.6 02/24/2022 09:47 AM    MCHC 33.8 02/24/2022 09:47 AM    MPV 9.8 02/24/2022 09:47 AM    NEUTSPOLYS 51.20 02/24/2022 09:47 AM    LYMPHOCYTES 39.20 02/24/2022 09:47 AM    MONOCYTES 6.40 02/24/2022 09:47 AM    EOSINOPHILS 2.30 02/24/2022 09:47 AM    BASOPHILS 0.50 02/24/2022 09:47 AM      Assessment and Plan.     1. Medicare annual wellness visit, subsequent  Reviewed PMH, PSH, FH, SH, ALL, MEDS, HCM/IMM.   Advised healthy habits, diet, exercise.  - Subsequent Annual Wellness Visit - Includes PPPS ()    2. Health care maintenance  Per HPI  - Subsequent Annual Wellness Visit - Includes PPPS ()    3. Screening for malignant neoplasm of prostate  - Subsequent Annual Wellness Visit - Includes PPPS ()  - PROSTATE SPECIFIC AG SCREENING; Future    4. Dyslipidemia   Borderline.  Discussed treatment options, patient prefers low-calorie diet, daily exercise, weight control  - Subsequent Annual Wellness Visit - Includes PPPS ()  - Lipid Profile; Standing  - Comp Metabolic Panel; Standing    5. Stage 3 chronic kidney disease, unspecified whether stage 3a or 3b CKD (HCC)   Advised good fluid intake, avoid  ibuprofen/Aleve  - Subsequent Annual Wellness Visit - Includes PPPS ()    6. Anemia, unspecified type  B12/folate, FIT were negative.  - Subsequent Annual Wellness Visit - Includes PPPS ()  - VIT B12,  FOLIC ACID  - CBC WITH DIFFERENTIAL; Standing    7. KORIN on CPAP   - Controlled, continue with current management.  - Subsequent Annual Wellness Visit - Includes PPPS ()  - Referral to Pulmonary and Sleep Medicine    8. Decreased hearing, left   -Daily activities are not affected  - Subsequent Annual Wellness Visit - Includes PPPS ()    9. Actinic keratosis  - Subsequent Annual Wellness Visit - Includes PPPS ()    Services suggested: No services needed at this time  Health Care Screening recommendations as per orders if indicated.  Referrals offered: PT/OT/Nutrition counseling/Behavioral Health/Smoking cessation as per orders if indicated.    Discussion today about general wellness and lifestyle habits:    · Prevent falls and reduce trip hazards; Cautioned about securing or removing rugs.  · Have a working fire alarm and carbon monoxide detector;   · Engage in regular physical activity and social activities     Follow-up: in 6  Months, labs

## 2022-03-28 NOTE — PROGRESS NOTES
DERMATOLOGY NOTE  FOLLOW UP VISIT       Chief complaint: Follow-Up ([facility])    Patient of DOTTIE Lofton, returns for annual skin cancer screeinging.    HPI/location: RT temple brown papule  Time present: 4-6 weeks  Painful lesion: No  Itching lesion: No  Enlarging lesion: No  Anything make it better or worse? No    HPI/location: RT shoulder, rough macule  Time present: 3 mos  Painful lesion: No  Itching lesion: No  Enlarging lesion: No  Anything make it better or worse? No    HPI/location: RT hairline, gritty papule  Time present: 2 mos  Painful lesion: No  Itching lesion: No  Enlarging lesion: No  Anything make it better or worse?    Patient completed course of Efudex 5% cream   applied to face and forehead 03/17/20- 04/28/20 ,    History of skin cancer: No  History of precancers/actinic keratoses: Yes, Details: AK's  History of biopsies:No  History of blistering/severe sunburns:Yes, Details: teenager  Family history of skin cancer:No  Family history of atypical moles:No      No Known Allergies     MEDICATIONS:  Medications relevant to specialty reviewed.     REVIEW OF SYSTEMS:   Positive for skin (see HPI)  Negative for fevers and chills       EXAM:  There were no vitals taken for this visit.  Constitutional: Well-developed, well-nourished, and in no distress.     A total body skin exam was performed excluding the genitals per patient preference and including the following areas: head (including face), neck, chest, abdomen, groin/buttocks, back, bilateral upper extremities, and bilateral lower extremities with the following pertinent findings listed below and/or in assessment/plan.     -sun exposed skin of trunk and b/l upper, lower extremities and face with scattered clinically benign light brown reticulated macules all of which were morphologically similar and none of which were suspicious for skin cancer today on exam    Several tan skin-colored, medium brown stuck-on waxy papules scattered on the face, trunk  and extremities    Multiple tan medium brown skin-colored macules papules scattered over the trunk, face and extremities, All with benign-appearing pigment network patterns on dermoscopy    Several scattered 1-3mm bright red macules and thin papules on the face, trunk and extremities    AK RT hairline, R forehead and  RT Forearm    1-2mm skin-colored to hyperpigmented, soft, pedunculated papules bilateral axillae    DF Left lateral lower extremitiy    IMPRESSION / PLAN:    1. Actinic keratosis  CRYOTHERAPY:  Risks (including, but not limited to: hypo or hyperpigmentation, redness, blister, blood blister, recurrence, need for further treatment, infection, scar) and benefits of cryotherapy discussed. Patient verbally agreed to proceed with treatment. 1 cryotherapy freeze thaw cycles of 10 seconds were applied to 3 lesions as noted on exam with cryac. Patient tolerated procedure well. Aftercare instructions given.      2. Multiple nevi  - Benign-appearing nature of lesions discussed. Advised to return to clinic for any new or concerning changes.  - ABCDE's of melanoma discussed      3. Lentigines  - Benign-appearing nature of lesions discussed. Advised to return to clinic for any new or concerning changes.      4. SK (seborrheic keratosis)  - Benign-appearing nature of lesions discussed. Advised to return to clinic for any new or concerning changes.      5. Cherry angioma  - Benign-appearing nature of lesions discussed. Advised to return to clinic for any new or concerning changes.      6. Acrochordon  - Benign-appearing nature of lesions discussed. Advised to return to clinic for any new or concerning changes.      7. Dermatofibroma  - Benign-appearing nature of lesions discussed. Advised to return to clinic for any new or concerning changes.      8. Skin cancer screening  Skin cancer education  - discussed importance of sun protective clothing, eyewear  - discussed importance of daily use of broad spectrum sunscreen  with SPF 30 or greater, as well as need for reapplication ~every 2 hours when exposed to UVR  - discussed importance of regular self-exams, ideally once per month, every 12 months exams in clinic  - JAZZY's of melanoma discussed  - patient to bring any new or concerning lesions to my attention  - Patient educational handout provided and reviewed with patient       Discussed risks, benefits, alternative treatments as well as common side effects associated with prescribed treatment  Patient verbalized understanding and agrees with plan regarding the above    I have performed a physical exam and reviewed and updated ROS and Plan today (3/28/2022). In review of dermatology visit (3/15/2021), there are no changes except as documented above.         Please note that this dictation was created using voice recognition software. I have made every reasonable attempt to correct obvious errors, but I expect that there are errors of grammar and possibly content that I did not discover before finalizing the note.      Return to clinic in: Return in about 1 year (around 3/28/2023) for [facility]. and as needed for any new or changing skin lesions.

## 2022-04-01 ENCOUNTER — PATIENT MESSAGE (OUTPATIENT)
Dept: HEALTH INFORMATION MANAGEMENT | Facility: OTHER | Age: 67
End: 2022-04-01

## 2022-04-22 ENCOUNTER — TELEPHONE (OUTPATIENT)
Dept: HEALTH INFORMATION MANAGEMENT | Facility: OTHER | Age: 67
End: 2022-04-22
Payer: MEDICARE

## 2022-05-11 ENCOUNTER — OFFICE VISIT (OUTPATIENT)
Dept: SLEEP MEDICINE | Facility: MEDICAL CENTER | Age: 67
End: 2022-05-11
Payer: MEDICARE

## 2022-05-11 VITALS
OXYGEN SATURATION: 93 % | HEIGHT: 68 IN | RESPIRATION RATE: 12 BRPM | DIASTOLIC BLOOD PRESSURE: 76 MMHG | HEART RATE: 83 BPM | WEIGHT: 190 LBS | SYSTOLIC BLOOD PRESSURE: 114 MMHG | BODY MASS INDEX: 28.79 KG/M2

## 2022-05-11 DIAGNOSIS — G47.33 OSA ON CPAP: ICD-10-CM

## 2022-05-11 PROCEDURE — 99214 OFFICE O/P EST MOD 30 MIN: CPT | Performed by: PREVENTIVE MEDICINE

## 2022-05-11 ASSESSMENT — FIBROSIS 4 INDEX: FIB4 SCORE: 1.52

## 2022-05-11 NOTE — PROGRESS NOTES
CC: Annual Visit    LAST SEEN: Dr. Roa 9/29.21    HPI:  Hussein Sarmiento Jr. is a 67 y.o.male. Mr. Sarmiento is initially seen here on May 6, 2020. This evaluation was conducted via telemedicine using secure encrypted software the me[employer] platform.  He had been followed at [place] since 2015 and treated with nocturnal CPAP.  He noted a dramatic improvement in daytime somnolence AND FEWER ha AND LESS SINUS PRESSURE.   This patient was last seen by Dr. Servando Gibbons on September 25, 2020 at that time he was 100% compliant using his machine just under 7 hours nightly.  Today his compliance data reveal:     COMPLIANCE DATA:  Machine type: ResMed air sense 10 AutoSet  Date range: April 11, 2022 through 5/10/2022  AHI: AHI 5.1  TIME USED: 6 hours and 50 minutes  PRESSURE SETTINGS: Minimum 7, maximum 15 cmH2O EPR full-time set at 2  LEAK: Minimal    This patient is using PAP therapy consistently and is benefiting from it.     The patient reports improved symptoms using positive airway pressure. Has experienced no significant problems with mask fit, mask leak, pressure tolerance, excessive airway dryness, nasal congestion, aerophagia, or chest pain.        Original sleep study: This patient had his original polysomnogram with PAP titration done at [facility] group.  The date was February 29, 2016.  In the diagnostic portion of the study he had an AHI of 18.6.  He also had in oxygen perfecto of 79%.  The patient was titrated on CPAP starting at 4 cm water pressure.  The titration continues up to 8 cm water pressure.  His treatment overall HPI was 9.  And his oxygen perfecto was 88% with 0 minutes spent below 88% oxygen saturation.     Past medical history: Dyslipidemia, unspecified anemia, decreased hearing left side, and history of chronic nasal congestion            Patient Active Problem List    Diagnosis Date Noted   • Stage 3 chronic kidney disease (HCC) 10/29/2020   • Decreased hearing, left 02/18/2020   •  Anemia, unspecified 02/15/2020   • Dyslipidemia 02/04/2020   • Actinic keratosis 02/04/2020   • KORIN on CPAP 02/04/2020   • Medicare annual wellness visit, subsequent 02/04/2020       Past Medical History:   Diagnosis Date   • Actinic keratosis    • Chickenpox    • Dyslipidemia    • Fijian measles    • Sleep apnea         Past Surgical History:   Procedure Laterality Date   • TONSILLECTOMY         Family History   Problem Relation Age of Onset   • Heart Disease Mother    • Cancer Father         colon   • Diabetes Sister    • Hypertension Sister    • Hyperlipidemia Sister    • Sleep Apnea Sister    • Diabetes Brother    • Hypertension Brother    • Hyperlipidemia Brother    • Sleep Apnea Brother        Social History     Socioeconomic History   • Marital status:      Spouse name: Not on file   • Number of children: Not on file   • Years of education: Not on file   • Highest education level: Bachelor's degree (e.g., BA, AB, BS)   Occupational History   • Not on file   Tobacco Use   • Smoking status: Never Smoker   • Smokeless tobacco: Never Used   Vaping Use   • Vaping Use: Never used   Substance and Sexual Activity   • Alcohol use: Yes     Comment: 2 drinks a day   • Drug use: Never   • Sexual activity: Not on file   Other Topics Concern   • Not on file   Social History Narrative   • Not on file     Social Determinants of Health     Financial Resource Strain: Low Risk    • Difficulty of Paying Living Expenses: Not hard at all   Food Insecurity: No Food Insecurity   • Worried About Running Out of Food in the Last Year: Never true   • Ran Out of Food in the Last Year: Never true   Transportation Needs: No Transportation Needs   • Lack of Transportation (Medical): No   • Lack of Transportation (Non-Medical): No   Physical Activity: Sufficiently Active   • Days of Exercise per Week: 7 days   • Minutes of Exercise per Session: 60 min   Stress: No Stress Concern Present   • Feeling of Stress : Not at all   Social  "Connections: Moderately Isolated   • Frequency of Communication with Friends and Family: More than three times a week   • Frequency of Social Gatherings with Friends and Family: More than three times a week   • Attends Buddhist Services: Never   • Active Member of Clubs or Organizations: No   • Attends Club or Organization Meetings: 1 to 4 times per year   • Marital Status:    Intimate Partner Violence: Not on file   Housing Stability: Low Risk    • Unable to Pay for Housing in the Last Year: No   • Number of Places Lived in the Last Year: 1   • Unstable Housing in the Last Year: No       Current Outpatient Medications   Medication Sig Dispense Refill   • Cholecalciferol (VITAMIN D3) 50 MCG (2000 UT) Tab Take  by mouth.       No current facility-administered medications for this visit.    \"CURRENT RX\"    ALLERGIES: Patient has no known allergies.    ROS    Constitutional: Denies  weight loss, fatigue  Cardiovascular: Denies chest pain, tightness, palpitations, swelling in legs/feet, difficulty breathing when lying down but gets better when sitting up.   Respiratory: Denies shortness of breath during the day  Sleep: per HPI  Gastrointestinal: Denies  difficulty swallowing, heartburn.  Musculoskeletal: Denies painful joints, sore muscles, back pain.        PHYSICAL EXAM  :   /76 (BP Location: Left arm, Patient Position: Sitting, BP Cuff Size: Adult)   Pulse 83   Resp 12   Ht 1.727 m (5' 8\")   Wt 86.2 kg (190 lb)   SpO2 93%   BMI 28.89 kg/m²   Appearance: Well-nourished, looks stated age, no acute distress  Eyes:   EOMI  ENMT: masked  Neck: Supple, trachea midline, no masses  Respiratory effort:  No intercostal retractions or use of accessory muscles  Musculoskeletal:  Grossly normal; gait and station normal  Skin:  No cyanosis  Neurologic: oriented to person, time, place, and purpose; judgement intact  Psychiatric:  No depression, anxiety, agitation      Medical Decision Making       The medical " record was reviewed in its entirety including the referral notes, records from primary care, consultants notes, hospital records, lab, imaging, microbiology, immunology, and immunizations.  Care gaps identified and reviewed with the patient.    Diagnostic and titration nocturnal polysomnogram's, home sleep apnea tests, continuous nocturnal oximetry results, multiple sleep latency tests, and compliance reports reviewed.    ASSESSMENT/PLAN: This patient has been on Pap therapy since 2016.  He is doing well on his current ResMed air sense 10 AutoSet.  His AHI today is slightly increased over the last year to 5.1.  Mostly the increase in events are apneas.  To address this issue his minimum pressure will be increased by one from 7 to 8 cm of water.    1. KORIN on CPAP    - DME Mask and Supplies  - DME Other    Has been advised to continue the current Pap Therapy.  Also advised to clean equipment frequently, and get new mask and supplies as allowed by insurance and DME.  Patient was advised to NEVER use  OZONE containing cleaning systems such as So Clean.    The risks of untreated sleep apnea were discussed with the patient at length. Patients with KORIN are at increased risk of cardiovascular disease including coronary artery disease, systemic arterial hypertension, pulmonary arterial hypertension, cardiac arrhythmias, and stroke. KORIN patients have an increased risk of motor vehicle accidents, type 2 diabetes, chronic kidney disease, and non-alcoholic liver disease. The patient was advised to avoid driving a motor vehicle when drowsy.      Have advised the patient to follow up with the appropriate healthcare practitioners for all other medical problems and issues.      RTC ANNUAL  1 YEAR    My total time spent caring for the patient on the day of the encounter was  30 minutes. This includes time time spent on a thorough chart review including other physician notes, any type of sleep study, as well as critical labs and  pulmonary and cardiac studies.  Additionally it includes discussions of good sleep hygiene, stimulus control and going over the need for consistency in terms of sleep preparation and practice.         Please note that this dictation was created using voice recognition software.  I have made every reasonable attempt to correct obvious errors, I expect that there are errors of grammar and possibly content that I did not discover before finalizing this note.

## 2022-08-15 ENCOUNTER — TELEPHONE (OUTPATIENT)
Dept: SCHEDULING | Facility: IMAGING CENTER | Age: 67
End: 2022-08-15

## 2022-09-22 NOTE — PROGRESS NOTES
Telephone Appointment Visit   Telemedicine visit was attempted via ZOOM however was unsuccessful and consequently the visit was switched to a telephone appointment.  As a means of avoiding spread of COVID-19, this visit is being conducted by telephone. This telephone visit was initiated by the patient and they verbally consented.    Time at start of call: 2:40    Reason for Call:  KORIN management    Patient Comments / History:   The patient is a 65-year-old male who is referred by Dr. Kraft, PCP, for sleep apnea management.  He is a former patient of [facility] [place] [facility] and presents to transfer sleep care.  He was originally diagnosed with KORIN in 2015 and prescribed CPAP therapy.  His sleep studies have been requested.  He felt CPAP dramatically improved his sleep quality as he was unable to sleep consistently through the night previously.  Over the past year he has noted recurrent nighttime awakenings.  CPAP compliance data was unavailable at time of consultation. He uses Dreamwear nasal pillows which he has not replaced in a year.  In terms of sleep habits, he gets to bed after 10:30 PM, falling asleep within minutes.  He then awaken almost on the hour throughout the night. He is unaware of snoring or breakthrough apneas as he sleeps alone.  He gets up by 5:30 AM, feeling relatively rested.  His BMI is 26.     Labs / Images Reviewed   none    Assessment and Plan:     1. KORIN on CPAP  - DME Mask and Supplies    Other orders  - Cholecalciferol (VITAMIN D3) 50 MCG (2000 UT) Tab; Take  by mouth.  The patient has been on CPAP therapy for KORIN since 2015.  He describes recurrent sleep disruption over the past year.  We have requested his sleep study results from [facility].  His CPAP mask is over a year old and requires replacement.  We discussed that his symptoms may be secondary to recurrent sleep apnea from poor mask fit, inappropriate CPAP pressures, etc.  We will establish him with a DME for new CPAP  supplies.  Once he has replaced his CPAP mask, we will download his CPAP compliance in 8 weeks.  Ultimately he may require a CPAP re-titration in the sleep laboratory if there is no improvement in symptoms.  Follow-up: Return in about 3 months (around 8/6/2020).    Time at end of call: 3:10  Total Time Spent: 30 min    Gilda Chandra M.D.   No

## 2022-10-10 ENCOUNTER — OFFICE VISIT (OUTPATIENT)
Dept: MEDICAL GROUP | Facility: MEDICAL CENTER | Age: 67
End: 2022-10-10
Payer: MEDICARE

## 2022-10-10 VITALS
HEART RATE: 64 BPM | WEIGHT: 180.8 LBS | DIASTOLIC BLOOD PRESSURE: 72 MMHG | BODY MASS INDEX: 26.78 KG/M2 | HEIGHT: 69 IN | OXYGEN SATURATION: 98 % | TEMPERATURE: 97.6 F | SYSTOLIC BLOOD PRESSURE: 120 MMHG

## 2022-10-10 DIAGNOSIS — Z12.5 SCREENING FOR PROSTATE CANCER: ICD-10-CM

## 2022-10-10 DIAGNOSIS — D64.9 ANEMIA, UNSPECIFIED TYPE: ICD-10-CM

## 2022-10-10 DIAGNOSIS — E55.9 HYPOVITAMINOSIS D: ICD-10-CM

## 2022-10-10 DIAGNOSIS — N18.30 STAGE 3 CHRONIC KIDNEY DISEASE, UNSPECIFIED WHETHER STAGE 3A OR 3B CKD: ICD-10-CM

## 2022-10-10 DIAGNOSIS — E78.5 DYSLIPIDEMIA: ICD-10-CM

## 2022-10-10 PROCEDURE — 99214 OFFICE O/P EST MOD 30 MIN: CPT | Performed by: PHYSICIAN ASSISTANT

## 2022-10-10 ASSESSMENT — FIBROSIS 4 INDEX: FIB4 SCORE: 1.52

## 2022-10-10 NOTE — ASSESSMENT & PLAN NOTE
Chronic: stable  Last lipid 2/22 showed mildly elevated triglycerides and LDL.  Patient watches his diet.  He is not on statin therapy

## 2022-10-10 NOTE — PROGRESS NOTES
"Subjective:   Hussein Sarmiento Jr. is a 67 y.o. male here today for     HPI: Patient is here to discuss:  Problem   Stage 3 Chronic Kidney Disease (Hcc)    2/22: GFR was 54       Anemia, Unspecified    Diagnosed in 30s. Hasn't had problems since then. No blood in stool. uptodate on colonoscopy. Drinks wine/whiskey 2-3/daily     Dyslipidemia          Current medicines (including changes today)  Current Outpatient Medications   Medication Sig Dispense Refill    Cholecalciferol (VITAMIN D3) 50 MCG (2000 UT) Tab Take  by mouth.       No current facility-administered medications for this visit.     He  has a past medical history of Actinic keratosis, Chickenpox, Dyslipidemia, [place] measles, and Sleep apnea.  Patient has no known allergies.     Social History and Family History were reviewed and updated.    ROS   No headaches, chest pain, no shortness of breath, abdominal pain, nausea, or vomiting.  All other systems were reviewed and are negative or noted as positive in the HPI.       Objective:     /72   Pulse 64   Temp 36.4 °C (97.6 °F) (Temporal)   Ht 1.753 m (5' 9\")   Wt 82 kg (180 lb 12.8 oz)   SpO2 98%  Body mass index is 26.7 kg/m².     Physical Exam:  General: Patient appears well-nourished, well-hydrated, nontoxic  HEENT, normocephalic atraumatic, PERRLA, extraocular movements intact, nares are patent and clear  Neck: No visible masses, thyromegaly or abnormalities noted  Cardiovascular.  Sitting comfortably without visible signs of edema  Lungs: No cyanosis noted, nondyspneic  Skin: Well perfused without evidence of rash or lesions  Neurological: Cranial nerves II through XII intact, normal gait  Musculoskeletal: Normal range of motion, normal strength and no deficit noted     Clinical Course/Lab Analysis:        Assessment and Plan:   The following treatment plan was discussed.  Signs and symptoms for which to return were discussed with patient at length.  Patient verbalized " understanding.    Problem List Items Addressed This Visit       Dyslipidemia     Chronic: stable  Last lipid 2/22 showed mildly elevated triglycerides and LDL.  Patient watches his diet.  He is not on statin therapy         Relevant Orders    LIPID PANEL    Anemia, unspecified     Chronic: stable         Relevant Orders    CBC WITH DIFFERENTIAL    Stage 3 chronic kidney disease (HCC)     Chronic:stable  Avoid nsaids/asa         Relevant Orders    Comp Metabolic Panel     Other Visit Diagnoses       Hypovitaminosis D        Relevant Orders    VITAMIN D 25-HYDROXY    Screening for prostate cancer        Relevant Orders    PROSTATE SPECIFIC AG SCREENING               Followup: 1 year or prn    Please note that this dictation was created using voice recognition software. I have made every reasonable attempt to correct obvious errors, but I expect that there are errors of grammar and possibly content that I did not discover before finalizing the note.

## 2022-12-27 ENCOUNTER — DOCUMENTATION (OUTPATIENT)
Dept: HEALTH INFORMATION MANAGEMENT | Facility: OTHER | Age: 67
End: 2022-12-27
Payer: MEDICARE

## 2023-02-13 ENCOUNTER — HOSPITAL ENCOUNTER (OUTPATIENT)
Dept: LAB | Facility: MEDICAL CENTER | Age: 68
End: 2023-02-13
Attending: PHYSICIAN ASSISTANT
Payer: MEDICARE

## 2023-02-13 DIAGNOSIS — Z12.5 SCREENING FOR PROSTATE CANCER: ICD-10-CM

## 2023-02-13 DIAGNOSIS — D64.9 ANEMIA, UNSPECIFIED TYPE: ICD-10-CM

## 2023-02-13 DIAGNOSIS — N18.30 STAGE 3 CHRONIC KIDNEY DISEASE, UNSPECIFIED WHETHER STAGE 3A OR 3B CKD: ICD-10-CM

## 2023-02-13 LAB
25(OH)D3 SERPL-MCNC: 44 NG/ML (ref 30–100)
ALBUMIN SERPL BCP-MCNC: 4.6 G/DL (ref 3.2–4.9)
ALBUMIN/GLOB SERPL: 2.2 G/DL
ALP SERPL-CCNC: 70 U/L (ref 30–99)
ALT SERPL-CCNC: 18 U/L (ref 2–50)
ANION GAP SERPL CALC-SCNC: 13 MMOL/L (ref 7–16)
AST SERPL-CCNC: 21 U/L (ref 12–45)
BASOPHILS # BLD AUTO: 0.3 % (ref 0–1.8)
BASOPHILS # BLD: 0.03 K/UL (ref 0–0.12)
BILIRUB SERPL-MCNC: 0.7 MG/DL (ref 0.1–1.5)
BUN SERPL-MCNC: 16 MG/DL (ref 8–22)
CALCIUM ALBUM COR SERPL-MCNC: 8.7 MG/DL (ref 8.5–10.5)
CALCIUM SERPL-MCNC: 9.2 MG/DL (ref 8.5–10.5)
CHLORIDE SERPL-SCNC: 100 MMOL/L (ref 96–112)
CHOLEST SERPL-MCNC: 190 MG/DL (ref 100–199)
CO2 SERPL-SCNC: 25 MMOL/L (ref 20–33)
CREAT SERPL-MCNC: 1.34 MG/DL (ref 0.5–1.4)
EOSINOPHIL # BLD AUTO: 0.15 K/UL (ref 0–0.51)
EOSINOPHIL NFR BLD: 1.3 % (ref 0–6.9)
ERYTHROCYTE [DISTWIDTH] IN BLOOD BY AUTOMATED COUNT: 44.2 FL (ref 35.9–50)
FASTING STATUS PATIENT QL REPORTED: NORMAL
GFR SERPLBLD CREATININE-BSD FMLA CKD-EPI: 58 ML/MIN/1.73 M 2
GLOBULIN SER CALC-MCNC: 2.1 G/DL (ref 1.9–3.5)
GLUCOSE SERPL-MCNC: 110 MG/DL (ref 65–99)
HCT VFR BLD AUTO: 40.7 % (ref 42–52)
HDLC SERPL-MCNC: 53 MG/DL
HGB BLD-MCNC: 13.8 G/DL (ref 14–18)
IMM GRANULOCYTES # BLD AUTO: 0.05 K/UL (ref 0–0.11)
IMM GRANULOCYTES NFR BLD AUTO: 0.4 % (ref 0–0.9)
LDLC SERPL CALC-MCNC: 105 MG/DL
LYMPHOCYTES # BLD AUTO: 2.56 K/UL (ref 1–4.8)
LYMPHOCYTES NFR BLD: 21.6 % (ref 22–41)
MCH RBC QN AUTO: 32.3 PG (ref 27–33)
MCHC RBC AUTO-ENTMCNC: 33.9 G/DL (ref 33.7–35.3)
MCV RBC AUTO: 95.3 FL (ref 81.4–97.8)
MONOCYTES # BLD AUTO: 0.96 K/UL (ref 0–0.85)
MONOCYTES NFR BLD AUTO: 8.1 % (ref 0–13.4)
NEUTROPHILS # BLD AUTO: 8.08 K/UL (ref 1.82–7.42)
NEUTROPHILS NFR BLD: 68.3 % (ref 44–72)
NRBC # BLD AUTO: 0 K/UL
NRBC BLD-RTO: 0 /100 WBC
PLATELET # BLD AUTO: 261 K/UL (ref 164–446)
PMV BLD AUTO: 9.9 FL (ref 9–12.9)
POTASSIUM SERPL-SCNC: 4 MMOL/L (ref 3.6–5.5)
PROT SERPL-MCNC: 6.7 G/DL (ref 6–8.2)
PSA SERPL-MCNC: 1.06 NG/ML (ref 0–4)
RBC # BLD AUTO: 4.27 M/UL (ref 4.7–6.1)
SODIUM SERPL-SCNC: 138 MMOL/L (ref 135–145)
TRIGL SERPL-MCNC: 158 MG/DL (ref 0–149)
WBC # BLD AUTO: 11.8 K/UL (ref 4.8–10.8)

## 2023-02-13 PROCEDURE — 84153 ASSAY OF PSA TOTAL: CPT

## 2023-02-13 PROCEDURE — 80053 COMPREHEN METABOLIC PANEL: CPT

## 2023-02-13 PROCEDURE — 82306 VITAMIN D 25 HYDROXY: CPT

## 2023-02-13 PROCEDURE — 80061 LIPID PANEL: CPT

## 2023-02-13 PROCEDURE — 36415 COLL VENOUS BLD VENIPUNCTURE: CPT

## 2023-02-13 PROCEDURE — 85025 COMPLETE CBC W/AUTO DIFF WBC: CPT

## 2023-02-19 ENCOUNTER — OFFICE VISIT (OUTPATIENT)
Dept: URGENT CARE | Facility: CLINIC | Age: 68
End: 2023-02-19
Payer: MEDICARE

## 2023-02-19 VITALS
BODY MASS INDEX: 26.37 KG/M2 | DIASTOLIC BLOOD PRESSURE: 74 MMHG | SYSTOLIC BLOOD PRESSURE: 136 MMHG | HEIGHT: 68 IN | HEART RATE: 84 BPM | RESPIRATION RATE: 14 BRPM | TEMPERATURE: 98 F | OXYGEN SATURATION: 96 % | WEIGHT: 174 LBS

## 2023-02-19 DIAGNOSIS — B96.89 BACTERIAL SINUSITIS: ICD-10-CM

## 2023-02-19 DIAGNOSIS — H10.33 ACUTE BACTERIAL CONJUNCTIVITIS OF BOTH EYES: ICD-10-CM

## 2023-02-19 DIAGNOSIS — J32.9 BACTERIAL SINUSITIS: ICD-10-CM

## 2023-02-19 PROCEDURE — 99213 OFFICE O/P EST LOW 20 MIN: CPT

## 2023-02-19 RX ORDER — AMOXICILLIN AND CLAVULANATE POTASSIUM 875; 125 MG/1; MG/1
1 TABLET, FILM COATED ORAL 2 TIMES DAILY
Qty: 14 TABLET | Refills: 0 | Status: SHIPPED | OUTPATIENT
Start: 2023-02-19 | End: 2023-02-26

## 2023-02-19 RX ORDER — POLYMYXIN B SULFATE AND TRIMETHOPRIM 1; 10000 MG/ML; [USP'U]/ML
1 SOLUTION OPHTHALMIC EVERY 4 HOURS
Qty: 4 ML | Refills: 0 | Status: SHIPPED | OUTPATIENT
Start: 2023-02-19 | End: 2023-02-23

## 2023-02-19 ASSESSMENT — FIBROSIS 4 INDEX: FIB4 SCORE: 1.29

## 2023-02-19 ASSESSMENT — VISUAL ACUITY: OU: 1

## 2023-02-19 NOTE — PROGRESS NOTES
"Subjective:   Hussein Sarmiento Jr. is a 68 y.o. male who presents for Eye Problem (X4days, both eyes, Red, itchy, watery ) and Nasal Congestion (X3-4days, Feels mucus, states he has mucus coming out but if he blows nose to much he gets bloody nose)      HPI:    Patient presents to urgent care with concerns of left eye discharge, nasal congestion, sore throat, headache. Symptoms started four days ago. He reports green nasal secretions. He states his left eye is red, itchy, has clear discharge and has spread to this right eye. Denies eyes matted shut. He states his son has similar illness.  Mild improvement with eye drops, warm showers, nasal rinses, warm compresses.  Denies fever, chills, night sweats, nausea, vomiting, diarrhea. Home covid test was negative. Denies light sensitivity, vision changes.    ROS As above in HPI    Medications:    Current Outpatient Medications on File Prior to Visit   Medication Sig Dispense Refill    Cholecalciferol (VITAMIN D3) 50 MCG (2000 UT) Tab Take  by mouth.       No current facility-administered medications on file prior to visit.        Allergies:   Patient has no known allergies.    Problem List:   Patient Active Problem List   Diagnosis    Dyslipidemia    Actinic keratosis    KORIN on CPAP    Medicare annual wellness visit, subsequent    Anemia, unspecified    Decreased hearing, left    Stage 3 chronic kidney disease (HCC)        Surgical History:  Past Surgical History:   Procedure Laterality Date    TONSILLECTOMY         Past Social Hx:   Social History     Tobacco Use    Smoking status: Never    Smokeless tobacco: Never   Vaping Use    Vaping Use: Never used   Substance Use Topics    Alcohol use: Yes     Comment: 2 drinks a day    Drug use: Never          Problem list, medications, and allergies reviewed by myself today in Epic.     Objective:     /74   Pulse 84   Temp 36.7 °C (98 °F) (Temporal)   Resp 14   Ht 1.727 m (5' 8\")   Wt 78.9 kg (174 lb)   SpO2 96% "   BMI 26.46 kg/m²     Physical Exam  Vitals and nursing note reviewed.   Constitutional:       General: He is not in acute distress.     Appearance: Normal appearance. He is not ill-appearing or diaphoretic.   HENT:      Head: Normocephalic and atraumatic.      Right Ear: Ear canal normal. A middle ear effusion is present. Tympanic membrane is not injected, erythematous or bulging.      Left Ear: Ear canal normal. A middle ear effusion is present. Tympanic membrane is not injected, erythematous or bulging.      Nose: Congestion and rhinorrhea present. Rhinorrhea is purulent.      Right Sinus: Maxillary sinus tenderness and frontal sinus tenderness present.      Left Sinus: Maxillary sinus tenderness and frontal sinus tenderness present.      Mouth/Throat:      Mouth: Mucous membranes are moist.      Pharynx: Uvula midline. Oropharyngeal exudate and posterior oropharyngeal erythema present. No pharyngeal swelling.      Tonsils: No tonsillar exudate. 1+ on the left.   Eyes:      General: Vision grossly intact. Gaze aligned appropriately.         Right eye: Discharge present. No foreign body or hordeolum.         Left eye: Discharge (Seropurulent discharge from left eye) present.No foreign body or hordeolum.      Extraocular Movements: Extraocular movements intact.      Conjunctiva/sclera:      Right eye: Right conjunctiva is injected.      Left eye: Left conjunctiva is injected.      Pupils: Pupils are equal, round, and reactive to light.   Cardiovascular:      Rate and Rhythm: Normal rate and regular rhythm.      Heart sounds: Normal heart sounds. No murmur heard.    No friction rub. No gallop.   Pulmonary:      Effort: No respiratory distress.      Breath sounds: Normal air entry. No stridor. Examination of the right-lower field reveals decreased breath sounds. Examination of the left-lower field reveals decreased breath sounds. Decreased breath sounds present. No wheezing, rhonchi or rales.   Chest:      Chest  wall: No tenderness.   Abdominal:      General: Bowel sounds are normal.      Palpations: Abdomen is soft.   Musculoskeletal:      Cervical back: No rigidity or tenderness.   Lymphadenopathy:      Cervical: No cervical adenopathy.   Skin:     General: Skin is warm and dry.      Capillary Refill: Capillary refill takes less than 2 seconds.      Findings: No rash.   Neurological:      Mental Status: He is oriented to person, place, and time.       Assessment/Plan:     Diagnosis and associated orders:   1. Acute bacterial conjunctivitis of both eyes  - polymixin-trimethoprim (POLYTRIM) 55716-4.1 UNIT/ML-% Solution; Administer 1 Drop into both eyes every 4 hours for 10 days.  Dispense: 4 mL; Refill: 0    2. Bacterial sinusitis  - amoxicillin-clavulanate (AUGMENTIN) 875-125 MG Tab; Take 1 Tablet by mouth 2 times a day for 7 days.  Dispense: 14 Tablet; Refill: 0        Comments/MDM:       Supportive measures encouraged: Rest, increased oral hydration, NSAIDs/tylenol as needed per package instructions, decongestant, warm humidification, warm compresses, nasal rinses, artificial tears.   Follow up with PCP       Please note that this dictation was created using voice recognition software. I have made a reasonable attempt to correct obvious errors, but I expect that there are errors of grammar and possibly content that I did not discover before finalizing the note.    This note was electronically signed by Shefali Urias DNP

## 2023-02-23 ENCOUNTER — OFFICE VISIT (OUTPATIENT)
Dept: MEDICAL GROUP | Facility: MEDICAL CENTER | Age: 68
End: 2023-02-23
Payer: MEDICARE

## 2023-02-23 VITALS
HEART RATE: 66 BPM | HEIGHT: 68 IN | SYSTOLIC BLOOD PRESSURE: 120 MMHG | RESPIRATION RATE: 16 BRPM | OXYGEN SATURATION: 95 % | TEMPERATURE: 97.9 F | BODY MASS INDEX: 27.43 KG/M2 | DIASTOLIC BLOOD PRESSURE: 72 MMHG | WEIGHT: 181 LBS

## 2023-02-23 DIAGNOSIS — N18.30 STAGE 3 CHRONIC KIDNEY DISEASE, UNSPECIFIED WHETHER STAGE 3A OR 3B CKD: ICD-10-CM

## 2023-02-23 DIAGNOSIS — R73.03 PREDIABETES: ICD-10-CM

## 2023-02-23 DIAGNOSIS — E78.5 DYSLIPIDEMIA: ICD-10-CM

## 2023-02-23 DIAGNOSIS — J01.00 ACUTE NON-RECURRENT MAXILLARY SINUSITIS: ICD-10-CM

## 2023-02-23 DIAGNOSIS — D72.829 LEUKOCYTOSIS, UNSPECIFIED TYPE: ICD-10-CM

## 2023-02-23 PROCEDURE — 99214 OFFICE O/P EST MOD 30 MIN: CPT | Performed by: PHYSICIAN ASSISTANT

## 2023-02-23 ASSESSMENT — FIBROSIS 4 INDEX: FIB4 SCORE: 1.29

## 2023-02-23 NOTE — PROGRESS NOTES
"Subjective:   Hussein Sarmiento Jr. is a 68 y.o. male here today for     HPI:Patient is here to discuss:    Lab results        Current medicines (including changes today)  Current Outpatient Medications   Medication Sig Dispense Refill    amoxicillin-clavulanate (AUGMENTIN) 875-125 MG Tab Take 1 Tablet by mouth 2 times a day for 7 days. 14 Tablet 0    Cholecalciferol (VITAMIN D3) 50 MCG (2000 UT) Tab Take  by mouth.       No current facility-administered medications for this visit.     He  has a past medical history of Actinic keratosis, Chickenpox, Dyslipidemia, [place] measles, and Sleep apnea.  Patient has no known allergies.     Social History and Family History were reviewed and updated.    ROS   No headaches, chest pain, no shortness of breath, abdominal pain, nausea, or vomiting.  All other systems were reviewed and are negative or noted as positive in the HPI.       Objective:     /72   Pulse 66   Temp 36.6 °C (97.9 °F) (Temporal)   Resp 16   Ht 1.727 m (5' 8\")   Wt 82.1 kg (181 lb)   SpO2 95%  Body mass index is 27.52 kg/m².     Physical Exam:  General: Patient appears well-nourished, well-hydrated, nontoxic  HEENT, normocephalic atraumatic, PERRLA, extraocular movements intact, nares are patent and clear  Neck: No visible masses, thyromegaly or abnormalities noted  Cardiovascular.  Sitting comfortably without visible signs of edema  Lungs: No cyanosis noted, nondyspneic  Skin: Well perfused without evidence of rash or lesions  Neurological: Cranial nerves II through XII intact, normal gait  Musculoskeletal: Normal range of motion, normal strength and no deficit noted     Clinical Course/Lab Analysis:     Latest Reference Range & Units 02/13/23 08:12   WBC 4.8 - 10.8 K/uL 11.8 (H)   RBC 4.70 - 6.10 M/uL 4.27 (L)   Hemoglobin 14.0 - 18.0 g/dL 13.8 (L)   Hematocrit 42.0 - 52.0 % 40.7 (L)   MCV 81.4 - 97.8 fL 95.3   MCH 27.0 - 33.0 pg 32.3   MCHC 33.7 - 35.3 g/dL 33.9   RDW 35.9 - 50.0 fL 44.2 "   Platelet Count 164 - 446 K/uL 261   MPV 9.0 - 12.9 fL 9.9   Neutrophils-Polys 44.00 - 72.00 % 68.30   Neutrophils (Absolute) 1.82 - 7.42 K/uL 8.08 (H)   Lymphocytes 22.00 - 41.00 % 21.60 (L)   Lymphs (Absolute) 1.00 - 4.80 K/uL 2.56   Monocytes 0.00 - 13.40 % 8.10   Monos (Absolute) 0.00 - 0.85 K/uL 0.96 (H)   Eosinophils 0.00 - 6.90 % 1.30   Eos (Absolute) 0.00 - 0.51 K/uL 0.15   Basophils 0.00 - 1.80 % 0.30   Baso (Absolute) 0.00 - 0.12 K/uL 0.03   Immature Granulocytes 0.00 - 0.90 % 0.40   Immature Granulocytes (abs) 0.00 - 0.11 K/uL 0.05   Nucleated RBC /100 WBC 0.00   NRBC (Absolute) K/uL 0.00   Sodium 135 - 145 mmol/L 138   Potassium 3.6 - 5.5 mmol/L 4.0   Chloride 96 - 112 mmol/L 100   Co2 20 - 33 mmol/L 25   Anion Gap 7.0 - 16.0  13.0   Glucose 65 - 99 mg/dL 110 (H)   Bun 8 - 22 mg/dL 16   Creatinine 0.50 - 1.40 mg/dL 1.34   GFR (CKD-EPI) >60 mL/min/1.73 m 2 58 !   Calcium 8.5 - 10.5 mg/dL 9.2   Correct Calcium 8.5 - 10.5 mg/dL 8.7   AST(SGOT) 12 - 45 U/L 21   ALT(SGPT) 2 - 50 U/L 18   Alkaline Phosphatase 30 - 99 U/L 70   Total Bilirubin 0.1 - 1.5 mg/dL 0.7   Albumin 3.2 - 4.9 g/dL 4.6   Total Protein 6.0 - 8.2 g/dL 6.7   Globulin 1.9 - 3.5 g/dL 2.1   A-G Ratio g/dL 2.2   Fasting Status  Fasting   Cholesterol,Tot 100 - 199 mg/dL 190   Triglycerides 0 - 149 mg/dL 158 (H)   HDL >=40 mg/dL 53   LDL <100 mg/dL 105 (H)   25-Hydroxy   Vitamin D 25 30 - 100 ng/mL 44   Prostatic Specific Antigen Tot 0.00 - 4.00 ng/mL 1.06   (H): Data is abnormally high  (L): Data is abnormally low  !: Data is abnormal    Assessment and Plan:   The following treatment plan was discussed.  Signs and symptoms for which to return were discussed with patient at length.  Patient verbalized understanding.    Problem List Items Addressed This Visit       Dyslipidemia     Chronic and unstable  No family history of heart disease.  Both parents  in their 90s.  Patient's LDL just mildly elevated at 105, total cholesterol is  190  Triglycerides 158, HDL is protective at 53  The 10-year ASCVD risk score (Judson SHANNON, et al., 2019) is: 13.4%  Patient understands risk and ascvd and does not wish for therapy         Stage 3 chronic kidney disease (HCC)     Chronic and stable  GFR went up from 54 last year to now 58 2023.  Avoid nephrotoxic medication such as NSAIDs and aspirin          Other Visit Diagnoses       Leukocytosis, unspecified type        Relevant Orders    CBC WITH DIFFERENTIAL    Acute non-recurrent maxillary sinusitis        Relevant Orders    CBC WITH DIFFERENTIAL    Prediabetes        Relevant Orders    HEMOGLOBIN A1C           Did order a CBC to check elevated white blood count.  It was 11.8.  However patient was seen the next day or 2 in the urgent care and diagnosed with a sinusitis and put on Augmentin.  Likely elevated white blood count correlated with infection    Followup: 1 year or prn    Please note that this dictation was created using voice recognition software. I have made every reasonable attempt to correct obvious errors, but I expect that there are errors of grammar and possibly content that I did not discover before finalizing the note.

## 2023-02-23 NOTE — ASSESSMENT & PLAN NOTE
New and unstable  Fasting glucose was 110.  I will order a hemoglobin A1c and follow.  Did discuss cutting back on carbohydrate intake including alcohol

## 2023-02-23 NOTE — ASSESSMENT & PLAN NOTE
Chronic and stable  GFR went up from 54 last year to now 58 2023.  Avoid nephrotoxic medication such as NSAIDs and aspirin

## 2023-02-23 NOTE — ASSESSMENT & PLAN NOTE
Chronic and unstable  No family history of heart disease.  Both parents  in their 90s.  Patient's LDL just mildly elevated at 105, total cholesterol is 190  Triglycerides 158, HDL is protective at 53  The 10-year ASCVD risk score (Judson SHANNON, et al., 2019) is: 13.4%  Patient understands risk and ascvd and does not wish for therapy

## 2023-03-07 ENCOUNTER — OFFICE VISIT (OUTPATIENT)
Dept: DERMATOLOGY | Facility: IMAGING CENTER | Age: 68
End: 2023-03-07
Payer: MEDICARE

## 2023-03-07 DIAGNOSIS — D18.01 CHERRY ANGIOMA: ICD-10-CM

## 2023-03-07 DIAGNOSIS — L81.4 LENTIGINES: ICD-10-CM

## 2023-03-07 DIAGNOSIS — D22.9 MULTIPLE NEVI: ICD-10-CM

## 2023-03-07 DIAGNOSIS — D23.9 DERMATOFIBROMA: ICD-10-CM

## 2023-03-07 DIAGNOSIS — L91.8 ACROCHORDON: ICD-10-CM

## 2023-03-07 DIAGNOSIS — Z12.83 SKIN CANCER SCREENING: ICD-10-CM

## 2023-03-07 DIAGNOSIS — L82.1 SK (SEBORRHEIC KERATOSIS): ICD-10-CM

## 2023-03-07 DIAGNOSIS — L57.0 ACTINIC KERATOSIS: ICD-10-CM

## 2023-03-07 PROCEDURE — 17000 DESTRUCT PREMALG LESION: CPT | Performed by: NURSE PRACTITIONER

## 2023-03-07 PROCEDURE — 17003 DESTRUCT PREMALG LES 2-14: CPT | Performed by: NURSE PRACTITIONER

## 2023-03-07 PROCEDURE — 99213 OFFICE O/P EST LOW 20 MIN: CPT | Mod: 25 | Performed by: NURSE PRACTITIONER

## 2023-03-07 NOTE — PROGRESS NOTES
DERMATOLOGY NOTE  FOLLOW UP VISIT       Chief complaint: Follow-Up (TRINITY)  Pt here today for a TRINITY. No new concerns      History of skin cancer: No  History of precancers/actinic keratoses: Yes, Details: AK's. Patient completed course of Efudex 5% cream applied to face and forehead 03/17/20- 04/28/20  History of biopsies:No  History of blistering/severe sunburns:Yes, Details: teenager  Family history of skin cancer:No  Family history of atypical moles:No      No Known Allergies     MEDICATIONS:  Medications relevant to specialty reviewed.     REVIEW OF SYSTEMS:   Positive for skin (see HPI)  Negative for fevers and chills       EXAM:  There were no vitals taken for this visit.  Constitutional: Well-developed, well-nourished, and in no distress.     A total body skin exam was performed excluding the genitals per patient preference and including the following areas: head (including face), neck, chest, abdomen, groin/buttocks, back, bilateral upper extremities, and bilateral lower extremities with the following pertinent findings listed below and/or in assessment/plan.     -sun exposed skin of trunk and b/l upper, lower extremities and face with scattered clinically benign light brown reticulated macules all of which were morphologically similar and none of which were suspicious for skin cancer today on exam    -Several tan skin-colored, medium brown stuck-on waxy papules scattered on the face, trunk and extremities    -Multiple tan medium brown skin-colored macules papules scattered over the trunk, face and extremities, All with benign-appearing pigment network patterns on dermoscopy    -Several scattered 1-3mm bright red macules and thin papules on the face, trunk and extremities    -1-2mm skin-colored to hyperpigmented, soft, pedunculated papules bilateral axillae    -DF Left lateral lower extremitiy    Ill-defined erythematous gritty/scaly papule over the right forehead, left temple, right upper  forehead    IMPRESSION / PLAN:    1. Actinic keratosis  CRYOTHERAPY:  Risks (including, but not limited to: skin discoloration, redness, blister, blood blister, recurrence, need for further treatment, infection, scar) and benefits of cryotherapy discussed. Patient verbally agreed to proceed with treatment. 1 cryotherapy freeze thaw cycles of 10 seconds were applied to 3 lesions on areas as noted on exam with cryac. Patient tolerated procedure well. Aftercare instructions given--no specific care needed unless irritated during healing process, can apply Vaseline with small band-aid if needed.      2. Lentigines  - Benign-appearing nature of lesions discussed during exam.   - Advised to continue to monitor for any return to clinic for new or concerning changes.      3. Cherry angioma  - Benign-appearing nature of lesions discussed during exam.   - Advised to continue to monitor for any return to clinic for new or concerning changes.      4. Multiple nevi  - Benign-appearing nature of lesions discussed during exam.   - Advised to continue to monitor for any return to clinic for new or concerning changes.  - ABCDE's of melanoma discussed/handout given      5. SK (seborrheic keratosis)  - Benign-appearing nature of lesions discussed during exam.   - Advised to continue to monitor for any return to clinic for new or concerning changes.      6. Dermatofibroma  - Benign-appearing nature of lesions discussed during exam.   - Advised to continue to monitor for any return to clinic for new or concerning changes.      7. Acrochordon  - Benign-appearing nature of lesions discussed during exam.   - Advised to continue to monitor for any return to clinic for new or concerning changes.      8. Skin cancer screening  Skin cancer education  discussed importance of sun protective clothing, eyewear in addition to the use of broad spectrum sunscreen with SPF 30 or greater, as well as need for reapplication ~every 2 hours when exposed to  UVR  discussed importance following up for any new or changing lesions as noted in handout given, but every 12 months exams in clinic in the setting of dermatologic history  ABCDE's of melanoma discussed/handout given           Discussed risks associated with LN2,   Patient verbalized understanding and agrees with plan regarding the above    I have performed a physical exam and reviewed and updated ROS and Plan today (3/7/2023). In review of dermatology visit (3/28/2022), there are no changes except as documented above.         Please note that this dictation was created using voice recognition software. I have made every reasonable attempt to correct obvious errors, but I expect that there are errors of grammar and possibly content that I did not discover before finalizing the note.      Return to clinic in: Return in about 1 year (around 3/7/2024) for TRINITY. and as needed for any new or changing skin lesions.

## 2023-03-16 ENCOUNTER — HOSPITAL ENCOUNTER (OUTPATIENT)
Dept: LAB | Facility: MEDICAL CENTER | Age: 68
End: 2023-03-16
Attending: PHYSICIAN ASSISTANT
Payer: MEDICARE

## 2023-03-16 DIAGNOSIS — J01.00 ACUTE NON-RECURRENT MAXILLARY SINUSITIS: ICD-10-CM

## 2023-03-16 DIAGNOSIS — R73.03 PREDIABETES: ICD-10-CM

## 2023-03-16 DIAGNOSIS — D72.829 LEUKOCYTOSIS, UNSPECIFIED TYPE: ICD-10-CM

## 2023-03-16 LAB
BASOPHILS # BLD AUTO: 0.5 % (ref 0–1.8)
BASOPHILS # BLD: 0.04 K/UL (ref 0–0.12)
EOSINOPHIL # BLD AUTO: 0.15 K/UL (ref 0–0.51)
EOSINOPHIL NFR BLD: 2 % (ref 0–6.9)
ERYTHROCYTE [DISTWIDTH] IN BLOOD BY AUTOMATED COUNT: 45 FL (ref 35.9–50)
EST. AVERAGE GLUCOSE BLD GHB EST-MCNC: 114 MG/DL
HBA1C MFR BLD: 5.6 % (ref 4–5.6)
HCT VFR BLD AUTO: 39.3 % (ref 42–52)
HGB BLD-MCNC: 13.1 G/DL (ref 14–18)
IMM GRANULOCYTES # BLD AUTO: 0.01 K/UL (ref 0–0.11)
IMM GRANULOCYTES NFR BLD AUTO: 0.1 % (ref 0–0.9)
LYMPHOCYTES # BLD AUTO: 2.92 K/UL (ref 1–4.8)
LYMPHOCYTES NFR BLD: 38.7 % (ref 22–41)
MCH RBC QN AUTO: 31.4 PG (ref 27–33)
MCHC RBC AUTO-ENTMCNC: 33.3 G/DL (ref 33.7–35.3)
MCV RBC AUTO: 94.2 FL (ref 81.4–97.8)
MONOCYTES # BLD AUTO: 0.54 K/UL (ref 0–0.85)
MONOCYTES NFR BLD AUTO: 7.2 % (ref 0–13.4)
NEUTROPHILS # BLD AUTO: 3.89 K/UL (ref 1.82–7.42)
NEUTROPHILS NFR BLD: 51.5 % (ref 44–72)
NRBC # BLD AUTO: 0 K/UL
NRBC BLD-RTO: 0 /100 WBC
PLATELET # BLD AUTO: 292 K/UL (ref 164–446)
PMV BLD AUTO: 9.7 FL (ref 9–12.9)
RBC # BLD AUTO: 4.17 M/UL (ref 4.7–6.1)
WBC # BLD AUTO: 7.6 K/UL (ref 4.8–10.8)

## 2023-03-16 PROCEDURE — 83036 HEMOGLOBIN GLYCOSYLATED A1C: CPT

## 2023-03-16 PROCEDURE — 36415 COLL VENOUS BLD VENIPUNCTURE: CPT

## 2023-03-16 PROCEDURE — 85025 COMPLETE CBC W/AUTO DIFF WBC: CPT

## 2023-05-16 ENCOUNTER — TELEMEDICINE (OUTPATIENT)
Dept: SLEEP MEDICINE | Facility: MEDICAL CENTER | Age: 68
End: 2023-05-16
Attending: PREVENTIVE MEDICINE
Payer: MEDICARE

## 2023-05-16 VITALS — WEIGHT: 176 LBS | BODY MASS INDEX: 26.67 KG/M2 | HEIGHT: 68 IN

## 2023-05-16 DIAGNOSIS — G47.33 OSA ON CPAP: ICD-10-CM

## 2023-05-16 PROCEDURE — 99214 OFFICE O/P EST MOD 30 MIN: CPT | Mod: 95 | Performed by: PREVENTIVE MEDICINE

## 2023-05-16 ASSESSMENT — FIBROSIS 4 INDEX: FIB4 SCORE: 1.15

## 2023-05-16 ASSESSMENT — PATIENT HEALTH QUESTIONNAIRE - PHQ9: CLINICAL INTERPRETATION OF PHQ2 SCORE: 0

## 2023-05-16 NOTE — PROGRESS NOTES
This evaluation was conducted via Zoom using secure and encrypted video conferencing technology.  The patient was in a private location in the [facility].  The patient's identity was confirmed and verbal consent was obtained for this virtual visit.       CHIEF COMPLAINT: Compliance check/Annual Visit  LAST SEEN: Dr. Roa on 5/11/22  HISTORY OF PRESENT ILLNESS:  Hussein Sarmiento Jr. is a 68 y.o.male  who ionline today to follow-up  for KORIN.    He expressed interest in  [facility].    COMPLIANCE DATA greater than 4 hours:93%  Machine type: ResMed air sense 10 AutoSet  Date range: April 16 through May 15, 2023  AHI: 3.3  TIME USED: 5-1/2 to 6 hours  PRESSURE SETTINGS: Minimum 8   maximum 15  LEAK: Elevated at 35.8    This patient is using PAP therapy consistently and is benefiting from it ..     Mr. Sarmiento was initially seen here on May 6, 2020. This evaluation was conducted via telemedicine using secure encrypted software the [employer] platform. He had previously been followed at [facility] since 2015 and treated with nocturnal CPAP.  He noted a dramatic improvement in decrease of daytime somnolence on CPAP therapy using DreamWear nasal pillows.             Original sleep study: This patient had his original polysomnogram with PAP titration done at [facility].  The date was February 29, 2016.  In the diagnostic portion of the study he had an AHI of 18.6.  He also had in oxygen perfecto of 79%.  The patient was titrated on CPAP starting at 4 cm water pressure.  The titration continues up to 8 cm water pressure.  His treatment overall AHI was 9.  And his oxygen perfecto was 88% with 0 minutes spent below 88% oxygen saturation.          Significant comorbidities and modifying factors: see below    PAST MEDICAL HISTORY:  Past Medical History:   Diagnosis Date    Actinic keratosis     Chickenpox     Dyslipidemia     [place] measles     Sleep apnea       PROBLEM LIST:  Patient Active Problem List     Diagnosis Date Noted    Prediabetes 02/23/2023    Stage 3 chronic kidney disease (HCC) 10/29/2020    Decreased hearing, left 02/18/2020    Anemia, unspecified 02/15/2020    Dyslipidemia 02/04/2020    Actinic keratosis 02/04/2020    KORIN on CPAP 02/04/2020    Medicare annual wellness visit, subsequent 02/04/2020     PAST SOCIAL HISTORY:  Past Surgical History:   Procedure Laterality Date    TONSILLECTOMY       PAST FAMILY HISTORY:  Family History   Problem Relation Age of Onset    Heart Disease Mother     Cancer Father         colon    Diabetes Sister     Hypertension Sister     Hyperlipidemia Sister     Sleep Apnea Sister     Diabetes Brother     Hypertension Brother     Hyperlipidemia Brother     Sleep Apnea Brother      SOCIAL HISTORY:  Social History     Socioeconomic History    Marital status:      Spouse name: Not on file    Number of children: Not on file    Years of education: Not on file    Highest education level: Bachelor's degree (e.g., BA, AB, BS)   Occupational History    Not on file   Tobacco Use    Smoking status: Never    Smokeless tobacco: Never   Vaping Use    Vaping Use: Never used   Substance and Sexual Activity    Alcohol use: Yes     Comment: 2 drinks a day    Drug use: Never    Sexual activity: Not on file   Other Topics Concern    Not on file   Social History Narrative    Not on file     Social Determinants of Health     Financial Resource Strain: Low Risk  (2/28/2022)    Overall Financial Resource Strain (CARDIA)     Difficulty of Paying Living Expenses: Not hard at all   Food Insecurity: No Food Insecurity (2/28/2022)    Hunger Vital Sign     Worried About Running Out of Food in the Last Year: Never true     Ran Out of Food in the Last Year: Never true   Transportation Needs: No Transportation Needs (2/28/2022)    PRAPARE - Transportation     Lack of Transportation (Medical): No     Lack of Transportation (Non-Medical): No   Physical Activity: Sufficiently Active (2/28/2022)     "Exercise Vital Sign     Days of Exercise per Week: 7 days     Minutes of Exercise per Session: 60 min   Stress: No Stress Concern Present (2/28/2022)    [employer] [place] [employer] - Occupational Stress Questionnaire     Feeling of Stress : Not at all   Social Connections: Moderately Isolated (2/28/2022)    Social Connection and Isolation Panel [NHANES]     Frequency of Communication with Friends and Family: More than three times a week     Frequency of Social Gatherings with Friends and Family: More than three times a week     Attends Denominational Services: Never     Active Member of Clubs or Organizations: No     Attends Club or Organization Meetings: 1 to 4 times per year     Marital Status:    Intimate Partner Violence: Not on file   Housing Stability: Low Risk  (2/28/2022)    Housing Stability Vital Sign     Unable to Pay for Housing in the Last Year: No     Number of Places Lived in the Last Year: 1     Unstable Housing in the Last Year: No     ALLERGIES: Patient has no known allergies.  MEDICATIONS:  Current Outpatient Medications   Medication Sig Dispense Refill    Cholecalciferol (VITAMIN D3) 50 MCG (2000 UT) Tab Take  by mouth.       No current facility-administered medications for this visit.    \"CURRENT RX\"    REVIEW OF SYSTEMS:  SEE HPI      PHYSICAL EXAM/VITALS:  VIRTUAL VISIT  Ht 1.727 m (5' 8\")   Wt 79.8 kg (176 lb)   BMI 26.76 kg/m²   Appearance: Well-nourished, well-developed,  looks stated age, no acute distress      MEDICAL DECISION MAKING:  The medical record was reviewed as it pertains to this referral. This includes records from primary care,consultants notes, referral request, hospital records, labs and imaging. Any available diagnostic and titration nocturnal polysomnograms, home sleep apnea tests, continuous nocturnal oximetry results, multiple sleep latency tests, and recent compliance reports were reviewed with the patient.    ASSESSMENT/PLAN:  Hussein Sarmiento "  is a 68 y.o.male who is doing well on PAP therapy.  He expressed an interest in [facility] and the evaluation process was explained to him in detail.  Patient is not ready to undergo an evaluation for [facility]SPIRE at this time    1. KORIN on CPAP  - DME Mask and Supplies        The risks of untreated sleep apnea were discussed with the patient at length. Patients with KORIN are at increased risk of cardiovascular disease including coronary artery disease, systemic arterial hypertension, pulmonary arterial hypertension, cardiac arrhythmias, and stroke. KORIN patients have an increased risk of motor vehicle accidents, type 2 diabetes, chronic kidney disease, and non-alcoholic liver disease. The patient was advised to avoid driving a motor vehicle when drowsy.  Have advised the patient to follow up with the appropriate healthcare practitioners for all other medical problems and issues.    RETURN TO CLINIC: Return in about 1 year (around 5/16/2024) for Compliance check.    My total time spent caring for the patient on the day of the encounter was 40 minutes. This includes time spent on a thorough chart review including other physician notes, all sleep studies, as well as critical labs and pulmonary and cardiac studies.  Additionally, it includes a thorough discussion of good sleep hygiene and stimulus control, as well as  the need for consistency in terms of sleep preparation and practice.    Please note that this dictation was created using voice recognition software.  I have made every reasonable attempt to correct obvious errors, I expect that there are errors of grammar and possibly content that I did not discover before finalizing this note.

## 2023-06-02 ENCOUNTER — RESEARCH ENCOUNTER (OUTPATIENT)
Dept: MEDICAL GROUP | Facility: PHYSICIAN GROUP | Age: 68
End: 2023-06-02
Payer: MEDICARE

## 2023-06-02 DIAGNOSIS — Z00.6 RESEARCH STUDY PATIENT: ICD-10-CM

## 2023-06-29 LAB
APOB+LDLR+PCSK9 GENE MUT ANL BLD/T: NOT DETECTED
BRCA1+BRCA2 DEL+DUP + FULL MUT ANL BLD/T: NOT DETECTED
MLH1+MSH2+MSH6+PMS2 GN DEL+DUP+FUL M: NOT DETECTED

## 2023-07-24 ENCOUNTER — TELEPHONE (OUTPATIENT)
Dept: HEALTH INFORMATION MANAGEMENT | Facility: OTHER | Age: 68
End: 2023-07-24
Payer: MEDICARE

## 2023-08-07 SDOH — ECONOMIC STABILITY: FOOD INSECURITY: WITHIN THE PAST 12 MONTHS, THE FOOD YOU BOUGHT JUST DIDN'T LAST AND YOU DIDN'T HAVE MONEY TO GET MORE.: NEVER TRUE

## 2023-08-07 SDOH — ECONOMIC STABILITY: INCOME INSECURITY: IN THE LAST 12 MONTHS, WAS THERE A TIME WHEN YOU WERE NOT ABLE TO PAY THE MORTGAGE OR RENT ON TIME?: NO

## 2023-08-07 SDOH — HEALTH STABILITY: PHYSICAL HEALTH: ON AVERAGE, HOW MANY MINUTES DO YOU ENGAGE IN EXERCISE AT THIS LEVEL?: 50 MIN

## 2023-08-07 SDOH — ECONOMIC STABILITY: HOUSING INSECURITY: IN THE LAST 12 MONTHS, HOW MANY PLACES HAVE YOU LIVED?: 1

## 2023-08-07 SDOH — ECONOMIC STABILITY: INCOME INSECURITY: HOW HARD IS IT FOR YOU TO PAY FOR THE VERY BASICS LIKE FOOD, HOUSING, MEDICAL CARE, AND HEATING?: NOT HARD AT ALL

## 2023-08-07 SDOH — HEALTH STABILITY: PHYSICAL HEALTH: ON AVERAGE, HOW MANY DAYS PER WEEK DO YOU ENGAGE IN MODERATE TO STRENUOUS EXERCISE (LIKE A BRISK WALK)?: 7 DAYS

## 2023-08-07 SDOH — ECONOMIC STABILITY: FOOD INSECURITY: WITHIN THE PAST 12 MONTHS, YOU WORRIED THAT YOUR FOOD WOULD RUN OUT BEFORE YOU GOT MONEY TO BUY MORE.: NEVER TRUE

## 2023-08-07 ASSESSMENT — SOCIAL DETERMINANTS OF HEALTH (SDOH)
WITHIN THE PAST 12 MONTHS, YOU WORRIED THAT YOUR FOOD WOULD RUN OUT BEFORE YOU GOT THE MONEY TO BUY MORE: NEVER TRUE
HOW OFTEN DO YOU ATTEND CHURCH OR RELIGIOUS SERVICES?: NEVER
HOW HARD IS IT FOR YOU TO PAY FOR THE VERY BASICS LIKE FOOD, HOUSING, MEDICAL CARE, AND HEATING?: NOT HARD AT ALL
HOW MANY DRINKS CONTAINING ALCOHOL DO YOU HAVE ON A TYPICAL DAY WHEN YOU ARE DRINKING: 1 OR 2
HOW OFTEN DO YOU ATTENT MEETINGS OF THE CLUB OR ORGANIZATION YOU BELONG TO?: MORE THAN 4 TIMES PER YEAR
HOW OFTEN DO YOU GET TOGETHER WITH FRIENDS OR RELATIVES?: MORE THAN THREE TIMES A WEEK
HOW OFTEN DO YOU ATTEND CHURCH OR RELIGIOUS SERVICES?: NEVER
HOW OFTEN DO YOU HAVE SIX OR MORE DRINKS ON ONE OCCASION: NEVER
IN A TYPICAL WEEK, HOW MANY TIMES DO YOU TALK ON THE PHONE WITH FAMILY, FRIENDS, OR NEIGHBORS?: MORE THAN THREE TIMES A WEEK
DO YOU BELONG TO ANY CLUBS OR ORGANIZATIONS SUCH AS CHURCH GROUPS UNIONS, FRATERNAL OR ATHLETIC GROUPS, OR SCHOOL GROUPS?: NO
IN A TYPICAL WEEK, HOW MANY TIMES DO YOU TALK ON THE PHONE WITH FAMILY, FRIENDS, OR NEIGHBORS?: MORE THAN THREE TIMES A WEEK
HOW OFTEN DO YOU HAVE A DRINK CONTAINING ALCOHOL: 4 OR MORE TIMES A WEEK
HOW OFTEN DO YOU ATTENT MEETINGS OF THE CLUB OR ORGANIZATION YOU BELONG TO?: MORE THAN 4 TIMES PER YEAR
DO YOU BELONG TO ANY CLUBS OR ORGANIZATIONS SUCH AS CHURCH GROUPS UNIONS, FRATERNAL OR ATHLETIC GROUPS, OR SCHOOL GROUPS?: NO
HOW OFTEN DO YOU GET TOGETHER WITH FRIENDS OR RELATIVES?: MORE THAN THREE TIMES A WEEK

## 2023-08-07 ASSESSMENT — LIFESTYLE VARIABLES
HOW OFTEN DO YOU HAVE SIX OR MORE DRINKS ON ONE OCCASION: NEVER
SKIP TO QUESTIONS 9-10: 1
AUDIT-C TOTAL SCORE: 4
HOW OFTEN DO YOU HAVE A DRINK CONTAINING ALCOHOL: 4 OR MORE TIMES A WEEK
HOW MANY STANDARD DRINKS CONTAINING ALCOHOL DO YOU HAVE ON A TYPICAL DAY: 1 OR 2

## 2023-08-10 ENCOUNTER — OFFICE VISIT (OUTPATIENT)
Dept: MEDICAL GROUP | Facility: MEDICAL CENTER | Age: 68
End: 2023-08-10
Payer: MEDICARE

## 2023-08-10 VITALS
DIASTOLIC BLOOD PRESSURE: 74 MMHG | HEIGHT: 69 IN | WEIGHT: 186.6 LBS | OXYGEN SATURATION: 98 % | SYSTOLIC BLOOD PRESSURE: 118 MMHG | TEMPERATURE: 98.3 F | HEART RATE: 89 BPM | RESPIRATION RATE: 20 BRPM | BODY MASS INDEX: 27.64 KG/M2

## 2023-08-10 DIAGNOSIS — E78.00 PURE HYPERCHOLESTEROLEMIA: ICD-10-CM

## 2023-08-10 DIAGNOSIS — R79.89 ABNORMAL CBC: ICD-10-CM

## 2023-08-10 DIAGNOSIS — R73.03 PREDIABETES: ICD-10-CM

## 2023-08-10 DIAGNOSIS — E55.9 HYPOVITAMINOSIS D: ICD-10-CM

## 2023-08-10 DIAGNOSIS — G47.33 OSA ON CPAP: ICD-10-CM

## 2023-08-10 DIAGNOSIS — Z00.00 ENCOUNTER FOR ANNUAL WELLNESS EXAM IN MEDICARE PATIENT: ICD-10-CM

## 2023-08-10 PROCEDURE — 3074F SYST BP LT 130 MM HG: CPT | Performed by: PHYSICIAN ASSISTANT

## 2023-08-10 PROCEDURE — G0439 PPPS, SUBSEQ VISIT: HCPCS | Performed by: PHYSICIAN ASSISTANT

## 2023-08-10 PROCEDURE — 3078F DIAST BP <80 MM HG: CPT | Performed by: PHYSICIAN ASSISTANT

## 2023-08-10 ASSESSMENT — ENCOUNTER SYMPTOMS: GENERAL WELL-BEING: EXCELLENT

## 2023-08-10 ASSESSMENT — FIBROSIS 4 INDEX: FIB4 SCORE: 1.15

## 2023-08-10 ASSESSMENT — ACTIVITIES OF DAILY LIVING (ADL): BATHING_REQUIRES_ASSISTANCE: 0

## 2023-08-10 ASSESSMENT — PATIENT HEALTH QUESTIONNAIRE - PHQ9: CLINICAL INTERPRETATION OF PHQ2 SCORE: 0

## 2023-08-10 NOTE — PROGRESS NOTES
Chief Complaint   Patient presents with    Annual Exam       HPI:  Hussein Sarmiento Jr. is a 68 y.o. here for Medicare Annual Wellness Visit     Patient Active Problem List    Diagnosis Date Noted    Pure hypercholesterolemia 08/10/2023    Abnormal CBC 08/10/2023    Prediabetes 02/23/2023    Stage 3 chronic kidney disease (HCC) 10/29/2020    Decreased hearing, left 02/18/2020    Anemia, unspecified 02/15/2020    Dyslipidemia 02/04/2020    Hypovitaminosis D 02/04/2020    Actinic keratosis 02/04/2020    KORIN on CPAP 02/04/2020    Medicare annual wellness visit, subsequent 02/04/2020       Current Outpatient Medications   Medication Sig Dispense Refill    Cholecalciferol (VITAMIN D3) 50 MCG (2000 UT) Tab Take  by mouth.       No current facility-administered medications for this visit.          Current supplements as per medication list.     Allergies: Patient has no known allergies.    Current social contact/activities: gym    He  reports that he has never smoked. He has never used smokeless tobacco. He reports current alcohol use. He reports that he does not use drugs.  Counseling given: Not Answered      ROS:    Gait: Uses no assistive device  Ostomy: No  Other tubes: No  Amputations: No  Chronic oxygen use: No  Last eye exam: 6 months ago  Wears hearing aids: No   : Denies any urinary leakage during the last 6 months    Screening:    Depression Screening  Little interest or pleasure in doing things?  0 - not at all  Feeling down, depressed , or hopeless? 0 - not at all  Patient Health Questionnaire Score: 0     If depressive symptoms identified deferred to follow up visit unless specifically addressed in assessment and plan.    Interpretation of PHQ-9 Total Score   Score Severity   1-4 No Depression   5-9 Mild Depression   10-14 Moderate Depression   15-19 Moderately Severe Depression   20-27 Severe Depression    Screening for Cognitive Impairment  Three Minute Recall (daughter, heaven, mountain) 3/3    Jono  clock face with all 12 numbers and set the hands to show 10 past 11.  Yes    Cognitive concerns identified deferred for follow up unless specifically addressed in assessment and plan.    Fall Risk Assessment  Has the patient had two or more falls in the last year or any fall with injury in the last year?  No    Safety Assessment  Throw rugs on floor.  No  Handrails on all stairs.  Yes  Good lighting in all hallways.  Yes  Difficulty hearing.  No  Patient counseled about all safety risks that were identified.    Functional Assessment ADLs  Are there any barriers preventing you from cooking for yourself or meeting nutritional needs?  No.    Are there any barriers preventing you from driving safely or obtaining transportation?  No.    Are there any barriers preventing you from using a telephone or calling for help?  No.    Are there any barriers preventing you from shopping?  No.    Are there any barriers preventing you from taking care of your own finances?  No.    Are there any barriers preventing you from managing your medications?  No.    Are there any barriers preventing you from showering, bathing or dressing yourself?  No.    Are you currently engaging in any exercise or physical activity?  Yes.     What is your perception of your health?  Excellent    Advance Care Planning  Do you have an Advance Directive, Living Will, Durable Power of , or POLST? Yes                 Health Maintenance Summary            Postponed - IMM ZOSTER VACCINES (2 of 3) Postponed until 1/10/2024      02/04/2015  Imm Admin: Zoster Vaccine Live (ZVL) (Zostavax) - HISTORICAL DATA              Postponed - COVID-19 Vaccine (3 - Pfizer series) Postponed until 8/10/2024      04/23/2021  Imm Admin: PFIZER PURPLE CAP SARS-COV-2 VACCINATION (12+)    03/30/2021  Imm Admin: PFIZER PURPLE CAP SARS-COV-2 VACCINATION (12+)              Postponed - IMM PNEUMOCOCCAL VACCINE: 65+ Years (1 - PCV) Postponed until 8/10/2024      No completion  history exists for this topic.              IMM INFLUENZA (1) Next due on 2021  Imm Admin: Influenza Vaccine Adult HD    10/16/2020  Imm Admin: Influenza Vaccine Adult HD    2020  Imm Admin: Influenza Vaccine Adult HD    11/10/2015  Imm Admin: Influenza Vaccine Quad Inj (Pf)    2013  Imm Admin: Influenza Seasonal Injectable - Historical Data    Only the first 5 history entries have been loaded, but more history exists.              COLORECTAL CANCER SCREENING (COLONOSCOPY - Every 10 Years) Next due on 3/11/2024      2022  OCCULT BLOOD FECES IMMUNOASSAY    2020  OCCULT BLOOD FECES IMMUNOASSAY    2014  AMB REFERRAL TO GI FOR COLONOSCOPY              Annual Wellness Visit (Every 366 Days) Next due on 8/10/2024      08/10/2023  Level of Service: ANNUAL WELLNESS VISIT-INCLUDES PPPS SUBSEQUE*    2022  Subsequent Annual Wellness Visit - Includes PPPS ()    2022  Visit Dx: Medicare annual wellness visit, subsequent    2021  Subsequent Annual Wellness Visit - Includes PPPS ()    2021  Visit Dx: Medicare annual wellness visit, subsequent    Only the first 5 history entries have been loaded, but more history exists.              IMM DTaP/Tdap/Td Vaccine (2 - Td or Tdap) Next due on 2015  Outside Immunization: Tdap              HEPATITIS C SCREENING  Completed      2020  Hepatitis C Antibody component of HCV Scrn ( 8943-9972 1xLife)              IMM HEP B VACCINE (Series Information) Aged Out      No completion history exists for this topic.              HPV Vaccines (Series Information) Aged Out      No completion history exists for this topic.              IMM MENINGOCOCCAL ACWY VACCINE (Series Information) Aged Out      No completion history exists for this topic.                    Patient Care Team:  Bharti Cooper P.A.-C. as PCP - General (Family Medicine)  Jessica Loza M.D. as PCP - [facility] Paneled  Arianna  [facility] as    Preferred (DME Supplier)  Dr. Jaylene Villanueva MD [facility] as Attending Team Physician (Otolaryngology)        Social History     Tobacco Use    Smoking status: Never    Smokeless tobacco: Never   Vaping Use    Vaping Use: Never used   Substance Use Topics    Alcohol use: Yes     Comment: 2 drinks a day    Drug use: Never     Family History   Problem Relation Age of Onset    Heart Disease Mother     Cancer Father         colon    Diabetes Sister     Hypertension Sister     Hyperlipidemia Sister     Sleep Apnea Sister     Diabetes Brother     Hypertension Brother     Hyperlipidemia Brother     Sleep Apnea Brother      He  has a past medical history of Actinic keratosis, Chickenpox, Dyslipidemia, Kinyarwanda measles, and Sleep apnea.   Past Surgical History:   Procedure Laterality Date    TONSILLECTOMY         Exam:   /74   Pulse 89   Temp 36.8 °C (98.3 °F) (Temporal)   Resp 20   Ht 1.76 m (5' 9.29\")   Wt 84.6 kg (186 lb 9.6 oz)   SpO2 98%  Body mass index is 27.33 kg/m².    Hearing excellent.    Dentition good  Alert, oriented in no acute distress.  Eye contact is good, speech goal directed, affect calm    Assessment and Plan. The following treatment and monitoring plan is recommended:    1. Encounter for annual wellness exam in Medicare patient  Discussed recommendation for pneumococcal vaccine and second shingles.  Anticipatory guidance discussed.  First vaccines  2. KORIN on CPAP  Chronic and stable on CPAP  3. Pure hypercholesterolemia  - Lipid Profile; Future  Patient has actually very good cholesterol panel which was done in February 2023.  Mildly elevated LDL at 105.  No family history of heart disease.  Parents lived into their 90s.  ASCVD was 13%.  He is aware and the declines statin therapy  4. Hypovitaminosis D  - VITAMIN D,25 HYDROXY (DEFICIENCY); Future  Chronic condition.  He declined the supplementation reliability was normal at " 44    5. Abnormal CBC  - CBC WITH DIFFERENTIAL; Future  This was noted in labs drawn in February with mildly decreased hemoglobin 13.1.  Denies blood mucus in the stool.  Will recheck.  Up-to-date on colon cancer screening    6. Prediabetes  - Comp Metabolic Panel; Future  - HEMOGLOBIN A1C; Future  Mild elevated sugars.  I am going to check hemoglobin A1c.  His weight is down about 7 pounds in a month.  Discussed that is eating a lot.  Please exercise daily and decrease calorie consumption      Services suggested: No services needed at this time  Health Care Screening: Age-appropriate preventive services recommended by USPTF and ACIP covered by Medicare were discussed today. Services ordered if indicated and agreed upon by the patient.  Referrals offered: Community-based lifestyle interventions to reduce health risks and promote self-management and wellness, fall prevention, nutrition, physical activity, tobacco-use cessation, weight loss, and mental health services as per orders if indicated.    Discussion today about general wellness and lifestyle habits:    Prevent falls and reduce trip hazards; Cautioned about securing or removing rugs.  Have a working fire alarm and carbon monoxide detector;   Engage in regular physical activity and social activities     Follow-up: No follow-ups on file.

## 2024-02-03 ENCOUNTER — HOSPITAL ENCOUNTER (OUTPATIENT)
Dept: LAB | Facility: MEDICAL CENTER | Age: 69
End: 2024-02-03
Attending: PHYSICIAN ASSISTANT
Payer: MEDICARE

## 2024-02-03 DIAGNOSIS — E78.00 PURE HYPERCHOLESTEROLEMIA: ICD-10-CM

## 2024-02-03 DIAGNOSIS — R79.89 ABNORMAL CBC: ICD-10-CM

## 2024-02-03 DIAGNOSIS — E55.9 HYPOVITAMINOSIS D: ICD-10-CM

## 2024-02-03 DIAGNOSIS — R73.03 PREDIABETES: ICD-10-CM

## 2024-02-03 LAB
25(OH)D3 SERPL-MCNC: 46 NG/ML (ref 30–100)
ALBUMIN SERPL BCP-MCNC: 4.6 G/DL (ref 3.2–4.9)
ALBUMIN/GLOB SERPL: 2.1 G/DL
ALP SERPL-CCNC: 67 U/L (ref 30–99)
ALT SERPL-CCNC: 18 U/L (ref 2–50)
ANION GAP SERPL CALC-SCNC: 13 MMOL/L (ref 7–16)
AST SERPL-CCNC: 26 U/L (ref 12–45)
BASOPHILS # BLD AUTO: 0.6 % (ref 0–1.8)
BASOPHILS # BLD: 0.04 K/UL (ref 0–0.12)
BILIRUB SERPL-MCNC: 0.3 MG/DL (ref 0.1–1.5)
BUN SERPL-MCNC: 17 MG/DL (ref 8–22)
CALCIUM ALBUM COR SERPL-MCNC: 8.3 MG/DL (ref 8.5–10.5)
CALCIUM SERPL-MCNC: 8.8 MG/DL (ref 8.5–10.5)
CHLORIDE SERPL-SCNC: 103 MMOL/L (ref 96–112)
CHOLEST SERPL-MCNC: 233 MG/DL (ref 100–199)
CO2 SERPL-SCNC: 22 MMOL/L (ref 20–33)
CREAT SERPL-MCNC: 1.33 MG/DL (ref 0.5–1.4)
EOSINOPHIL # BLD AUTO: 0.18 K/UL (ref 0–0.51)
EOSINOPHIL NFR BLD: 2.6 % (ref 0–6.9)
ERYTHROCYTE [DISTWIDTH] IN BLOOD BY AUTOMATED COUNT: 44.7 FL (ref 35.9–50)
EST. AVERAGE GLUCOSE BLD GHB EST-MCNC: 111 MG/DL
FASTING STATUS PATIENT QL REPORTED: NORMAL
GFR SERPLBLD CREATININE-BSD FMLA CKD-EPI: 58 ML/MIN/1.73 M 2
GLOBULIN SER CALC-MCNC: 2.2 G/DL (ref 1.9–3.5)
GLUCOSE SERPL-MCNC: 102 MG/DL (ref 65–99)
HBA1C MFR BLD: 5.5 % (ref 4–5.6)
HCT VFR BLD AUTO: 40.1 % (ref 42–52)
HDLC SERPL-MCNC: 43 MG/DL
HGB BLD-MCNC: 13.8 G/DL (ref 14–18)
IMM GRANULOCYTES # BLD AUTO: 0.01 K/UL (ref 0–0.11)
IMM GRANULOCYTES NFR BLD AUTO: 0.1 % (ref 0–0.9)
LDLC SERPL CALC-MCNC: 142 MG/DL
LYMPHOCYTES # BLD AUTO: 2.85 K/UL (ref 1–4.8)
LYMPHOCYTES NFR BLD: 41.4 % (ref 22–41)
MCH RBC QN AUTO: 31.4 PG (ref 27–33)
MCHC RBC AUTO-ENTMCNC: 34.4 G/DL (ref 32.3–36.5)
MCV RBC AUTO: 91.3 FL (ref 81.4–97.8)
MONOCYTES # BLD AUTO: 0.55 K/UL (ref 0–0.85)
MONOCYTES NFR BLD AUTO: 8 % (ref 0–13.4)
NEUTROPHILS # BLD AUTO: 3.26 K/UL (ref 1.82–7.42)
NEUTROPHILS NFR BLD: 47.3 % (ref 44–72)
NRBC # BLD AUTO: 0 K/UL
NRBC BLD-RTO: 0 /100 WBC (ref 0–0.2)
PLATELET # BLD AUTO: 236 K/UL (ref 164–446)
PMV BLD AUTO: 9.9 FL (ref 9–12.9)
POTASSIUM SERPL-SCNC: 4.4 MMOL/L (ref 3.6–5.5)
PROT SERPL-MCNC: 6.8 G/DL (ref 6–8.2)
RBC # BLD AUTO: 4.39 M/UL (ref 4.7–6.1)
SODIUM SERPL-SCNC: 138 MMOL/L (ref 135–145)
TRIGL SERPL-MCNC: 238 MG/DL (ref 0–149)
WBC # BLD AUTO: 6.9 K/UL (ref 4.8–10.8)

## 2024-02-03 PROCEDURE — 82306 VITAMIN D 25 HYDROXY: CPT

## 2024-02-03 PROCEDURE — 36415 COLL VENOUS BLD VENIPUNCTURE: CPT

## 2024-02-03 PROCEDURE — 80053 COMPREHEN METABOLIC PANEL: CPT

## 2024-02-03 PROCEDURE — 80061 LIPID PANEL: CPT

## 2024-02-03 PROCEDURE — 85025 COMPLETE CBC W/AUTO DIFF WBC: CPT

## 2024-02-03 PROCEDURE — 83036 HEMOGLOBIN GLYCOSYLATED A1C: CPT

## 2024-03-07 ENCOUNTER — OFFICE VISIT (OUTPATIENT)
Dept: MEDICAL GROUP | Facility: MEDICAL CENTER | Age: 69
End: 2024-03-07
Payer: MEDICARE

## 2024-03-07 ENCOUNTER — OFFICE VISIT (OUTPATIENT)
Dept: DERMATOLOGY | Facility: IMAGING CENTER | Age: 69
End: 2024-03-07
Payer: MEDICARE

## 2024-03-07 VITALS
WEIGHT: 191 LBS | HEART RATE: 66 BPM | HEIGHT: 68 IN | OXYGEN SATURATION: 96 % | DIASTOLIC BLOOD PRESSURE: 80 MMHG | BODY MASS INDEX: 28.95 KG/M2 | SYSTOLIC BLOOD PRESSURE: 116 MMHG | TEMPERATURE: 97.7 F

## 2024-03-07 DIAGNOSIS — D22.9 MULTIPLE NEVI: ICD-10-CM

## 2024-03-07 DIAGNOSIS — D23.9 DERMATOFIBROMA: ICD-10-CM

## 2024-03-07 DIAGNOSIS — E78.00 PURE HYPERCHOLESTEROLEMIA: ICD-10-CM

## 2024-03-07 DIAGNOSIS — Z12.11 SCREENING FOR COLORECTAL CANCER: ICD-10-CM

## 2024-03-07 DIAGNOSIS — M77.12 LATERAL EPICONDYLITIS OF LEFT ELBOW: ICD-10-CM

## 2024-03-07 DIAGNOSIS — L82.1 SK (SEBORRHEIC KERATOSIS): ICD-10-CM

## 2024-03-07 DIAGNOSIS — L81.4 LENTIGINES: ICD-10-CM

## 2024-03-07 DIAGNOSIS — E78.5 DYSLIPIDEMIA: ICD-10-CM

## 2024-03-07 DIAGNOSIS — L57.0 ACTINIC KERATOSIS: ICD-10-CM

## 2024-03-07 DIAGNOSIS — Z12.12 SCREENING FOR COLORECTAL CANCER: ICD-10-CM

## 2024-03-07 DIAGNOSIS — Z12.83 SKIN CANCER SCREENING: ICD-10-CM

## 2024-03-07 DIAGNOSIS — L91.8 ACROCHORDON: ICD-10-CM

## 2024-03-07 DIAGNOSIS — N18.31 STAGE 3A CHRONIC KIDNEY DISEASE: ICD-10-CM

## 2024-03-07 DIAGNOSIS — D18.01 CHERRY ANGIOMA: ICD-10-CM

## 2024-03-07 PROCEDURE — 17000 DESTRUCT PREMALG LESION: CPT | Performed by: NURSE PRACTITIONER

## 2024-03-07 PROCEDURE — 17003 DESTRUCT PREMALG LES 2-14: CPT | Performed by: NURSE PRACTITIONER

## 2024-03-07 PROCEDURE — 3074F SYST BP LT 130 MM HG: CPT | Performed by: PHYSICIAN ASSISTANT

## 2024-03-07 PROCEDURE — 99213 OFFICE O/P EST LOW 20 MIN: CPT | Mod: 25 | Performed by: NURSE PRACTITIONER

## 2024-03-07 PROCEDURE — 99214 OFFICE O/P EST MOD 30 MIN: CPT | Performed by: PHYSICIAN ASSISTANT

## 2024-03-07 PROCEDURE — 3079F DIAST BP 80-89 MM HG: CPT | Performed by: PHYSICIAN ASSISTANT

## 2024-03-07 RX ORDER — METHYLPREDNISOLONE 4 MG/1
TABLET ORAL
Qty: 21 TABLET | Refills: 0 | Status: SHIPPED | OUTPATIENT
Start: 2024-03-07

## 2024-03-07 ASSESSMENT — FIBROSIS 4 INDEX: FIB4 SCORE: 1.79

## 2024-03-07 NOTE — PROGRESS NOTES
DERMATOLOGY NOTE  FOLLOW UP VISIT       Chief complaint: Follow-Up ([facility])  Pt has  a few spots on back that would like evaluation    HPI/location: Cyst on Penis  Time present: Was taken out 30 years ago. Seems like its coming back  Painful lesion: No  Itching lesion: No  Enlarging lesion: Yes      History of skin cancer: No  History of precancers/actinic keratoses: Yes, Details: AK's. Patient completed course of Efudex 5% cream applied to face and forehead 03/17/20- 04/28/20  History of biopsies:No  History of blistering/severe sunburns:Yes, Details: teenager  Family history of skin cancer:No  Family history of atypical moles:No      No Known Allergies     MEDICATIONS:  Medications relevant to specialty reviewed.     REVIEW OF SYSTEMS:   Positive for skin (see HPI)  Negative for fevers and chills       EXAM:  There were no vitals taken for this visit.  Constitutional: Well-developed, well-nourished, and in no distress.     A total body skin exam was performed including the genitals per patient preference and including the following areas: head (including face), neck, chest, abdomen, groin/buttocks, back, bilateral upper extremities, and bilateral lower extremities with the following pertinent findings listed below and/or in assessment/plan.     -sun exposed skin of trunk and b/l upper, lower extremities and face with scattered clinically benign light brown reticulated macules all of which were morphologically similar and none of which were suspicious for skin cancer today on exam    -Several tan skin-colored, medium brown stuck-on waxy papules scattered on the face, trunk and extremities    -Multiple tan medium brown skin-colored macules papules scattered over the trunk, face and extremities, All with benign-appearing pigment network patterns on dermoscopy    -Several scattered 1-3mm bright red macules and thin papules on the face, trunk and extremities    -1-2mm skin-colored to hyperpigmented, soft, pedunculated  papules bilateral axillae    -DF Left lateral lower extremitiy    -Ill-defined erythematous gritty/scaly papule over the Forehead, bilateral temples, rt infraorbital, mid anterior neck/upper chest    - no visible lesion on shaft of penis seen on exam      IMPRESSION / PLAN:    1. Actinic keratosis  CRYOTHERAPY:  Risks (including, but not limited to: skin discoloration, redness, blister, blood blister, recurrence, need for further treatment, infection, scar) and benefits of cryotherapy discussed. Patient verbally agreed to proceed with treatment. 1 cryotherapy freeze thaw cycles of 10 seconds were applied to 9 lesions on areas as noted on exam with cryac. Patient tolerated procedure well. Aftercare instructions given--no specific care needed unless irritated during healing process, can apply Vaseline with small band-aid if needed.      2. Lentigines  - Benign-appearing nature of lesions discussed during exam.   - Advised to continue to monitor for any return to clinic for new or concerning changes.      3. Cherry angioma  - Benign-appearing nature of lesions discussed during exam.   - Advised to continue to monitor for any return to clinic for new or concerning changes.      4. Multiple nevi  - Benign-appearing nature of lesions discussed during exam.   - Advised to continue to monitor for any return to clinic for new or concerning changes.  - ABCDE's of melanoma discussed/handout given      5. SK (seborrheic keratosis)  - Benign-appearing nature of lesions discussed during exam.   - Advised to continue to monitor for any return to clinic for new or concerning changes.      6. Acrochordon  - Benign-appearing nature of lesions discussed during exam.   - Advised to continue to monitor for any return to clinic for new or concerning changes.      7. Dermatofibroma  - Benign-appearing nature of lesions discussed during exam.   - Advised to continue to monitor for any return to clinic for new or concerning changes.      8.  Skin cancer screening  Skin cancer education  discussed importance of sun protective clothing, eyewear in addition to the use of broad spectrum sunscreen with SPF 30 or greater, as well as need for reapplication ~every 2 hours when exposed to UVR/handout given  discussed importance following up for any new or changing lesions as noted in handout given, but every 12 months exams in clinic in the setting of dermatologic history  ABCDE's of melanoma discussed/handout given             pt understands risks associated with LN2,   Patient verbalized understanding and agrees with plan regarding the above    I have performed a physical exam and reviewed and updated ROS and Plan today (3/7/2024). In review of dermatology visit (3/7/2023), there are no changes except as documented above.         Please note that this dictation was created using voice recognition software. I have made every reasonable attempt to correct obvious errors, but I expect that there are errors of grammar and possibly content that I did not discover before finalizing the note.      Return to clinic in: Return in about 1 year (around 3/7/2025) for [facility]IN[facility]. and as needed for any new or changing skin lesions.

## 2024-03-07 NOTE — ASSESSMENT & PLAN NOTE
Chronic and unstable  No family history of heart disease.  Both parents  in their 90s.  Patient's LDL jis 142, total cholesterol is 233  Triglycerides 2388, HDL is protective at 43  The 10-year ASCVD risk score (Judson SHANNON, et al., 2019) is: 16.7%    Patient understands risk and ascvd and does not wish for therapy

## 2024-03-07 NOTE — PROGRESS NOTES
"Subjective:   Hussein Sarmiento Jr. is a 69 y.o. male here today for     HPI: Patient is here to discuss:  Problem   Lateral Epicondylitis of Left Elbow    3/7/24: from shovelling snow. X 2 months. left     Stage 3 Chronic Kidney Disease (Hcc)    2/22: GFR was 54  2/23: GFR was 58  3/7/24: GFR was 58       Dyslipidemia          Current medicines (including changes today)  Current Outpatient Medications   Medication Sig Dispense Refill    methylPREDNISolone (MEDROL DOSEPAK) 4 MG Tablet Therapy Pack As directed on the packaging label. 21 Tablet 0    Cholecalciferol (VITAMIN D3) 50 MCG (2000 UT) Tab Take  by mouth.       No current facility-administered medications for this visit.     He  has a past medical history of Actinic keratosis, Chickenpox, Dyslipidemia, Hungarian measles, and Sleep apnea.  Patient has no known allergies.     Social History and Family History were reviewed and updated.    ROS   No headaches, chest pain, no shortness of breath, abdominal pain, nausea, or vomiting.  All other systems were reviewed and are negative or noted as positive in the HPI.       Objective:     /80 (BP Location: Left arm, Patient Position: Sitting, BP Cuff Size: Adult)   Pulse 66   Temp 36.5 °C (97.7 °F) (Temporal)   Ht 1.727 m (5' 8\")   Wt 86.6 kg (191 lb)   SpO2 96%  Body mass index is 29.04 kg/m².     Physical Exam:  General: Patient appears well-nourished, well-hydrated, nontoxic  HEENT, normocephalic atraumatic, PERRLA, extraocular movements intact, nares are patent and clear  Neck: No visible masses, thyromegaly or abnormalities noted  Cardiovascular.  Sitting comfortably without visible signs of edema  Lungs: No cyanosis noted, nondyspneic  Skin: Well perfused without evidence of rash or lesions  Neurological: Cranial nerves II through XII intact, normal gait  Musculoskeletal: Normal range of motion, normal strength and no deficit noted.  Patient has pain in the lateral epicondyle region of the right " upper extremity    Clinical Course/Lab Analysis:       Latest Reference Range & Units 02/03/24 08:31   WBC 4.8 - 10.8 K/uL 6.9   RBC 4.70 - 6.10 M/uL 4.39 (L)   Hemoglobin 14.0 - 18.0 g/dL 13.8 (L)   Hematocrit 42.0 - 52.0 % 40.1 (L)   MCV 81.4 - 97.8 fL 91.3   MCH 27.0 - 33.0 pg 31.4   MCHC 32.3 - 36.5 g/dL 34.4   RDW 35.9 - 50.0 fL 44.7   Platelet Count 164 - 446 K/uL 236   MPV 9.0 - 12.9 fL 9.9   Neutrophils-Polys 44.00 - 72.00 % 47.30   Neutrophils (Absolute) 1.82 - 7.42 K/uL 3.26   Lymphocytes 22.00 - 41.00 % 41.40 (H)   Lymphs (Absolute) 1.00 - 4.80 K/uL 2.85   Monocytes 0.00 - 13.40 % 8.00   Monos (Absolute) 0.00 - 0.85 K/uL 0.55   Eosinophils 0.00 - 6.90 % 2.60   Eos (Absolute) 0.00 - 0.51 K/uL 0.18   Basophils 0.00 - 1.80 % 0.60   Baso (Absolute) 0.00 - 0.12 K/uL 0.04   Immature Granulocytes 0.00 - 0.90 % 0.10   Immature Granulocytes (abs) 0.00 - 0.11 K/uL 0.01   Nucleated RBC 0.00 - 0.20 /100 WBC 0.00   NRBC (Absolute) K/uL 0.00   Sodium 135 - 145 mmol/L 138   Potassium 3.6 - 5.5 mmol/L 4.4   Chloride 96 - 112 mmol/L 103   Co2 20 - 33 mmol/L 22   Anion Gap 7.0 - 16.0  13.0   Glucose 65 - 99 mg/dL 102 (H)   Bun 8 - 22 mg/dL 17   Creatinine 0.50 - 1.40 mg/dL 1.33   GFR (CKD-EPI) >60 mL/min/1.73 m 2 58 !   Calcium 8.5 - 10.5 mg/dL 8.8   Correct Calcium 8.5 - 10.5 mg/dL 8.3 (L)   AST(SGOT) 12 - 45 U/L 26   ALT(SGPT) 2 - 50 U/L 18   Alkaline Phosphatase 30 - 99 U/L 67   Total Bilirubin 0.1 - 1.5 mg/dL 0.3   Albumin 3.2 - 4.9 g/dL 4.6   Total Protein 6.0 - 8.2 g/dL 6.8   Globulin 1.9 - 3.5 g/dL 2.2   A-G Ratio g/dL 2.1   Glycohemoglobin 4.0 - 5.6 % 5.5   Estim. Avg Glu mg/dL 111   Fasting Status  Fasting   Cholesterol,Tot 100 - 199 mg/dL 233 (H)   Triglycerides 0 - 149 mg/dL 238 (H)   HDL >=40 mg/dL 43   LDL <100 mg/dL 142 (H)   25-Hydroxy   Vitamin D 25 30 - 100 ng/mL 46   (L): Data is abnormally low  (H): Data is abnormally high  !: Data is abnormal  Assessment and Plan:   The following treatment plan was  discussed.  Signs and symptoms for which to return were discussed with patient at length.  Patient verbalized understanding.    Problem List Items Addressed This Visit       Dyslipidemia     Chronic and unstable  No family history of heart disease.  Both parents  in their 90s.  Patient's LDL jis 142, total cholesterol is 233  Triglycerides 2388, HDL is protective at 43  The 10-year ASCVD risk score (Judson SHANNON, et al., 2019) is: 16.7%    Patient understands risk and ascvd and does not wish for therapy         Relevant Orders    Lipid Profile    Stage 3 chronic kidney disease (HCC)     Chronic and stable  GFR stable at 58.  Avoid nephrotoxic medication such as NSAIDs and aspirin         Pure hypercholesterolemia    Lateral epicondylitis of left elbow     New and usntable  Steroid pack  Discussed brace and referral to [facility]         Relevant Medications    methylPREDNISolone (MEDROL DOSEPAK) 4 MG Tablet Therapy Pack    Other Relevant Orders    Referral to Orthopedics     Other Visit Diagnoses       Screening for colorectal cancer        Relevant Orders    Referral to GI for Colonoscopy               Followup:    Please note that this dictation was created using voice recognition software. I have made every reasonable attempt to correct obvious errors, but I expect that there are errors of grammar and possibly content that I did not discover before finalizing the note.

## 2024-05-06 ENCOUNTER — TELEPHONE (OUTPATIENT)
Dept: HEALTH INFORMATION MANAGEMENT | Facility: OTHER | Age: 69
End: 2024-05-06
Payer: MEDICARE

## 2024-05-30 ENCOUNTER — TELEMEDICINE (OUTPATIENT)
Dept: SLEEP MEDICINE | Facility: MEDICAL CENTER | Age: 69
End: 2024-05-30
Attending: PREVENTIVE MEDICINE
Payer: MEDICARE

## 2024-05-30 VITALS — BODY MASS INDEX: 27.13 KG/M2 | HEIGHT: 68 IN | WEIGHT: 179 LBS

## 2024-05-30 DIAGNOSIS — G47.33 OSA ON CPAP: ICD-10-CM

## 2024-05-30 ASSESSMENT — FIBROSIS 4 INDEX: FIB4 SCORE: 1.79

## 2024-05-30 NOTE — PROGRESS NOTES
This evaluation was conducted via Zoom using secure and encrypted video conferencing technology.  The patient was in a private location in the [facility].  The patient's identity was confirmed and verbal consent was obtained for this virtual visit.     CHIEF COMPLAINT: Compliance check/Annual Visit/First Compliance  LAST SEEN: Dr. Roa on 5/16/23  HISTORY OF PRESENT ILLNESS:  Hussein Sarmiento Jr. is a 69 y.o.male   who is seen  today to follow-up  for KORIN.      COMPLIANCE DATA greater than 4 hours: 93% %  Machine type: Airsense 10 Autoset  Date range: 4/30-5/29/2024  AHI: 2.7  TIME USED: 6 hrs. 9 mins  PRESSURE SETTINGS: min 8, max 15  LEAK: acceptable  DME: Preferred  Oxygen Bleed-in?: no    This patient is using PAP therapy consistently and is benefiting from it . He noted a dramatic improvement in decrease of daytime somnolence on CPAP therapy .    Sleep Study History:  Original sleep study: This patient had his original polysomnogram with PAP titration done at [facility].  The date was February 29, 2016.  In the diagnostic portion of the study he had an AHI of 18.6.  He also had in oxygen perfecto of 79%.  The patient was titrated on CPAP starting at 4 cm water pressure.  The titration continues up to 8 cm water pressure.  His treatment overall AHI was 9.  And his oxygen perfecto was 88% with 0 minutes spent below 88% oxygen saturation.     Significant comorbidities and modifying factors: see below    PAST MEDICAL HISTORY:  Past Medical History:   Diagnosis Date    Actinic keratosis     Chickenpox     Dyslipidemia     [place] measles     Sleep apnea       PROBLEM LIST:  Patient Active Problem List    Diagnosis Date Noted    Lateral epicondylitis of left elbow 03/07/2024    Pure hypercholesterolemia 08/10/2023    Abnormal CBC 08/10/2023    Prediabetes 02/23/2023    Stage 3 chronic kidney disease 10/29/2020    Decreased hearing, left 02/18/2020    Anemia, unspecified 02/15/2020    Dyslipidemia  02/04/2020    Hypovitaminosis D 02/04/2020    Actinic keratosis 02/04/2020    KORIN on CPAP 02/04/2020    Medicare annual wellness visit, subsequent 02/04/2020     PAST SOCIAL HISTORY:  Past Surgical History:   Procedure Laterality Date    TONSILLECTOMY       PAST FAMILY HISTORY:  Family History   Problem Relation Age of Onset    Heart Disease Mother     Cancer Father         colon    Diabetes Sister     Hypertension Sister     Hyperlipidemia Sister     Sleep Apnea Sister     Diabetes Brother     Hypertension Brother     Hyperlipidemia Brother     Sleep Apnea Brother      SOCIAL HISTORY:  Social History     Socioeconomic History    Marital status:      Spouse name: Not on file    Number of children: Not on file    Years of education: Not on file    Highest education level: Bachelor's degree (e.g., BA, AB, BS)   Occupational History    Not on file   Tobacco Use    Smoking status: Never    Smokeless tobacco: Never   Vaping Use    Vaping status: Never Used   Substance and Sexual Activity    Alcohol use: Yes     Comment: 2 drinks a day    Drug use: Never    Sexual activity: Not on file   Other Topics Concern    Not on file   Social History Narrative    Not on file     Social Determinants of Health     Financial Resource Strain: Low Risk  (8/7/2023)    Overall Financial Resource Strain (CARDIA)     Difficulty of Paying Living Expenses: Not hard at all   Food Insecurity: No Food Insecurity (8/7/2023)    Hunger Vital Sign     Worried About Running Out of Food in the Last Year: Never true     Ran Out of Food in the Last Year: Never true   Transportation Needs: No Transportation Needs (8/7/2023)    PRAPARE - Transportation     Lack of Transportation (Medical): No     Lack of Transportation (Non-Medical): No   Physical Activity: Sufficiently Active (8/7/2023)    Exercise Vital Sign     Days of Exercise per Week: 7 days     Minutes of Exercise per Session: 50 min   Stress: No Stress Concern Present (8/7/2023)    [place] [employer] - Occupational Stress Questionnaire     Feeling of Stress : Not at all   Social Connections: Moderately Isolated (8/7/2023)    Social Connection and Isolation Panel [NHANES]     Frequency of Communication with Friends and Family: More than three times a week     Frequency of Social Gatherings with Friends and Family: More than three times a week     Attends Pentec[employer]al Services: Never     Active Member of Clubs or Organizations: No     Attends Club or Organization Meetings: More than 4 times per year     Marital Status:    Intimate Partner Violence: Not on file   Housing Stability: Low Risk  (8/7/2023)    Housing Stability Vital Sign     Unable to Pay for Housing in the Last Year: No     Number of Places Lived in the Last Year: 1     Unstable Housing in the Last Year: No     ALLERGIES: Patient has no known allergies.  MEDICATIONS:  Current Outpatient Medications   Medication Sig Dispense Refill    Cholecalciferol (VITAMIN D3) 50 MCG (2000 UT) Tab Take  by mouth.      GAVILYTE-G 236 g Recon Soln FOLLOW GI CONSULTANTS INSTRUCTION HANDOUT: OK TO SUBSTITUTE FOR INSURANCE PREFERRED      methylPREDNISolone (MEDROL DOSEPAK) 4 MG Tablet Therapy Pack As directed on the packaging label. 21 Tablet 0     No current facility-administered medications for this visit.    \"CURRENT RX\"    REVIEW OF SYSTEMS:  SEE HPI      PHYSICAL EXAM/VITALS:   TELEMEDICINE VISIT  Ht 1.727 m (5' 8\")   Wt 81.2 kg (179 lb)   BMI 27.22 kg/m²   Appearance: Well-nourished, well-developed,  looks stated age, no acute distress      MEDICAL DECISION MAKING:  The medical record was reviewed as it pertains to this referral. This includes records from primary care,consultants notes, referral request, hospital records, labs and imaging. Any available diagnostic and titration nocturnal polysomnograms, home sleep apnea tests, continuous nocturnal oximetry results, multiple sleep latency tests, and recent compliance reports " were reviewed with the patient.    ASSESSMENT/PLAN:  Hussein Sarmiento Jr. is a 69 y.o.male who is doing well on PAP therapy. No changes are recommended at this time.  An order for DME mask and supplies will be submitted on his behalf.         DIAGNOSES :      1. KORIN on CPAP  - DME Mask and Supplies        The risks of untreated sleep apnea were discussed with the patient at length. Patients with KORIN are at increased risk of cardiovascular disease including coronary artery disease, systemic arterial hypertension, pulmonary arterial hypertension, cardiac arrhythmias, and stroke. KORIN patients have an increased risk of motor vehicle accidents, type 2 diabetes, chronic kidney disease, and non-alcoholic liver disease. The patient was advised to avoid driving a motor vehicle when drowsy.    Have advised the patient to follow up with the appropriate healthcare practitioners for all other medical problems and issues.    RETURN TO CLINIC: Return in about 1 year (around 5/30/2025) for Annual visit, With Dr Roa.    My total time spent caring for the patient on the day of the encounter was 40 minutes. This includes time spent on a thorough chart review including other physician notes, all sleep studies, as well as critical labs and pulmonary and cardiac studies.  Additionally, it includes a thorough discussion of good sleep hygiene and stimulus control, as well as  the need for consistency in terms of sleep preparation and practice.    Please note that this dictation was created using voice recognition software.  I have made every reasonable attempt to correct obvious errors, I expect that there are errors of grammar and possibly content that I did not discover before finalizing this note.

## 2024-07-08 ASSESSMENT — ENCOUNTER SYMPTOMS: GENERAL WELL-BEING: EXCELLENT

## 2024-07-08 ASSESSMENT — ACTIVITIES OF DAILY LIVING (ADL): BATHING_REQUIRES_ASSISTANCE: 0

## 2024-07-08 ASSESSMENT — PATIENT HEALTH QUESTIONNAIRE - PHQ9
2. FEELING DOWN, DEPRESSED, IRRITABLE, OR HOPELESS: NOT AT ALL
1. LITTLE INTEREST OR PLEASURE IN DOING THINGS: NOT AT ALL

## 2024-07-10 ENCOUNTER — OFFICE VISIT (OUTPATIENT)
Dept: FAMILY PLANNING/WOMEN'S HEALTH CLINIC | Facility: PHYSICIAN GROUP | Age: 69
End: 2024-07-10
Payer: MEDICARE

## 2024-07-10 VITALS
WEIGHT: 187 LBS | DIASTOLIC BLOOD PRESSURE: 70 MMHG | BODY MASS INDEX: 28.34 KG/M2 | SYSTOLIC BLOOD PRESSURE: 112 MMHG | HEIGHT: 68 IN

## 2024-07-10 DIAGNOSIS — G47.33 OSA ON CPAP: ICD-10-CM

## 2024-07-10 DIAGNOSIS — E78.5 DYSLIPIDEMIA: ICD-10-CM

## 2024-07-10 DIAGNOSIS — N18.31 STAGE 3A CHRONIC KIDNEY DISEASE: ICD-10-CM

## 2024-07-10 DIAGNOSIS — D64.9 ANEMIA, UNSPECIFIED TYPE: ICD-10-CM

## 2024-07-10 PROCEDURE — 99214 OFFICE O/P EST MOD 30 MIN: CPT

## 2024-07-10 PROCEDURE — 1126F AMNT PAIN NOTED NONE PRSNT: CPT

## 2024-07-10 SDOH — ECONOMIC STABILITY: INCOME INSECURITY: IN THE LAST 12 MONTHS, WAS THERE A TIME WHEN YOU WERE NOT ABLE TO PAY THE MORTGAGE OR RENT ON TIME?: NO

## 2024-07-10 SDOH — ECONOMIC STABILITY: INCOME INSECURITY: HOW HARD IS IT FOR YOU TO PAY FOR THE VERY BASICS LIKE FOOD, HOUSING, MEDICAL CARE, AND HEATING?: NOT HARD AT ALL

## 2024-07-10 SDOH — ECONOMIC STABILITY: FOOD INSECURITY: WITHIN THE PAST 12 MONTHS, YOU WORRIED THAT YOUR FOOD WOULD RUN OUT BEFORE YOU GOT MONEY TO BUY MORE.: NEVER TRUE

## 2024-07-10 SDOH — ECONOMIC STABILITY: HOUSING INSECURITY: IN THE LAST 12 MONTHS, HOW MANY PLACES HAVE YOU LIVED?: 1

## 2024-07-10 SDOH — ECONOMIC STABILITY: FOOD INSECURITY: WITHIN THE PAST 12 MONTHS, THE FOOD YOU BOUGHT JUST DIDN'T LAST AND YOU DIDN'T HAVE MONEY TO GET MORE.: NEVER TRUE

## 2024-07-10 ASSESSMENT — PATIENT HEALTH QUESTIONNAIRE - PHQ9: CLINICAL INTERPRETATION OF PHQ2 SCORE: 0

## 2024-07-10 ASSESSMENT — PAIN SCALES - GENERAL: PAINLEVEL: NO PAIN

## 2024-07-10 ASSESSMENT — FIBROSIS 4 INDEX: FIB4 SCORE: 1.79

## 2024-08-02 ENCOUNTER — APPOINTMENT (OUTPATIENT)
Dept: LAB | Facility: MEDICAL CENTER | Age: 69
End: 2024-08-02
Payer: MEDICARE

## 2024-08-02 DIAGNOSIS — E78.5 DYSLIPIDEMIA: ICD-10-CM

## 2024-08-02 LAB
CHOLEST SERPL-MCNC: 225 MG/DL (ref 100–199)
FASTING STATUS PATIENT QL REPORTED: NORMAL
HDLC SERPL-MCNC: 54 MG/DL
LDLC SERPL CALC-MCNC: 132 MG/DL
TRIGL SERPL-MCNC: 193 MG/DL (ref 0–149)

## 2024-08-02 PROCEDURE — 80061 LIPID PANEL: CPT

## 2024-08-02 PROCEDURE — 36415 COLL VENOUS BLD VENIPUNCTURE: CPT

## 2024-08-06 ENCOUNTER — OFFICE VISIT (OUTPATIENT)
Dept: MEDICAL GROUP | Facility: MEDICAL CENTER | Age: 69
End: 2024-08-06
Payer: MEDICARE

## 2024-08-06 VITALS
OXYGEN SATURATION: 97 % | HEIGHT: 68 IN | BODY MASS INDEX: 28.37 KG/M2 | HEART RATE: 88 BPM | TEMPERATURE: 98.5 F | WEIGHT: 187.2 LBS | SYSTOLIC BLOOD PRESSURE: 112 MMHG | DIASTOLIC BLOOD PRESSURE: 68 MMHG

## 2024-08-06 DIAGNOSIS — N18.31 STAGE 3A CHRONIC KIDNEY DISEASE: ICD-10-CM

## 2024-08-06 DIAGNOSIS — E78.00 PURE HYPERCHOLESTEROLEMIA: ICD-10-CM

## 2024-08-06 PROCEDURE — 3074F SYST BP LT 130 MM HG: CPT | Performed by: PHYSICIAN ASSISTANT

## 2024-08-06 PROCEDURE — 3078F DIAST BP <80 MM HG: CPT | Performed by: PHYSICIAN ASSISTANT

## 2024-08-06 PROCEDURE — 99214 OFFICE O/P EST MOD 30 MIN: CPT | Performed by: PHYSICIAN ASSISTANT

## 2024-08-06 ASSESSMENT — FIBROSIS 4 INDEX: FIB4 SCORE: 1.79

## 2024-08-06 NOTE — PROGRESS NOTES
"Subjective:     History of Present Illness  The patient is a 69-year-old male who presents for evaluation of hyperlipidemia.    He reports a slight decrease in his cholesterol levels compared to the previous year.    Supplemental Information  He is on CPAP for sleep apnea.    SOCIAL HISTORY  He drinks alcohol.    FAMILY HISTORY  His parents had heart disease, but they did not die until their 90s. His father  of colon cancer.      Current medicines (including changes today)  Current Outpatient Medications   Medication Sig Dispense Refill    Cholecalciferol (VITAMIN D3) 50 MCG ( UT) Tab Take  by mouth.       No current facility-administered medications for this visit.     He  has a past medical history of Actinic keratosis, Chickenpox, Dyslipidemia, [place] measles, and Sleep apnea.    ROS   No chest pain, no shortness of breath, no abdominal pain  Positive ROS as per HPI.  All other systems reviewed and are negative.     Objective:     /68 (BP Location: Left arm, Patient Position: Sitting, BP Cuff Size: Adult)   Pulse 88   Temp 36.9 °C (98.5 °F) (Temporal)   Ht 1.727 m (5' 8\")   Wt 84.9 kg (187 lb 3.2 oz)   SpO2 97%  Body mass index is 28.46 kg/m².   Physical Exam    Constitutional: Alert, no distress.  Skin: Warm, dry, good turgor, no rashes in visible areas.  Eye: Equal, round and reactive, conjunctiva clear, lids normal.  ENMT: Lips without lesions, good dentition, oropharynx clear.  Neck: Trachea midline, no masses, no thyromegaly. No cervical or supraclavicular lymphadenopathy  Respiratory: Unlabored respiratory effort, lungs clear to auscultation, no wheezes, no ronchi.  Cardiovascular: Normal S1, S2, no murmur, no edema.  Abdomen: Soft, non-tender, no masses, no hepatosplenomegaly.  Psych: Alert and oriented x3, normal affect and mood.      Results  Laboratory Studies  LDL cholesterol was 142. Triglycerides were high.        Assessment and Plan:   The following treatment plan was " discussed    Assessment & Plan  1. Hyperlipidemia.  The patient had a 6-month follow-up lipids, which showed slight improvement in triglycerides and LDL went from 142 to 132. He does not have a strong family history of heart disease. His ASCVD is 16 percent, but this is largely in Parkinson's 69 in a male. I am going to do a CT calcium at this point. At that point, based on his results, we will determine if low dose Crestor might be of benefit. He prefers not to be on medication and this is reasonable.      ORDERS:  1. Stage 3a chronic kidney disease      2. Pure hypercholesterolemia    - Lipid Profile; Future  - CT-CARDIAC SCORING; Future      Anticipatory guidance discussed at length including regular daily exercise with a goal of 150 minutes a week.  Recommendation to eat the Mediterranean diet to decrease cardiovascular risk.  Encouraged avoiding high fructose corn syrup and other simple sugars to reduce risk of obesity and type 2 diabetes.    Follow Up: 6 months     Please note that this dictation was created using voice recognition software. I have made every reasonable attempt to correct obvious errors, but I expect that there are errors of grammar and possibly content that I did not discover before finalizing the note.      Attestation      Verbal consent was acquired by the patient to use [employer] ambient listening note generation during this visit Yes

## 2024-08-13 ENCOUNTER — HOSPITAL ENCOUNTER (OUTPATIENT)
Dept: RADIOLOGY | Facility: MEDICAL CENTER | Age: 69
End: 2024-08-13
Attending: PHYSICIAN ASSISTANT
Payer: COMMERCIAL

## 2024-08-13 DIAGNOSIS — E78.00 PURE HYPERCHOLESTEROLEMIA: ICD-10-CM

## 2024-08-13 PROCEDURE — 4410556 CT-CARDIAC SCORING (SELF PAY ONLY)

## 2024-08-14 PROBLEM — R93.1 AGATSTON CORONARY ARTERY CALCIUM SCORE LESS THAN 100: Status: ACTIVE | Noted: 2024-08-14

## 2024-11-25 DIAGNOSIS — D49.2 SKIN GROWTH: ICD-10-CM

## 2024-12-24 ENCOUNTER — OFFICE VISIT (OUTPATIENT)
Dept: DERMATOLOGY | Facility: IMAGING CENTER | Age: 69
End: 2024-12-24
Payer: MEDICARE

## 2024-12-24 DIAGNOSIS — L82.1 SK (SEBORRHEIC KERATOSIS): ICD-10-CM

## 2024-12-24 DIAGNOSIS — L57.0 ACTINIC KERATOSIS: ICD-10-CM

## 2024-12-24 PROCEDURE — 17000 DESTRUCT PREMALG LESION: CPT | Performed by: NURSE PRACTITIONER

## 2024-12-24 PROCEDURE — 99212 OFFICE O/P EST SF 10 MIN: CPT | Mod: 25 | Performed by: NURSE PRACTITIONER

## 2024-12-24 PROCEDURE — 17003 DESTRUCT PREMALG LES 2-14: CPT | Performed by: NURSE PRACTITIONER

## 2024-12-24 NOTE — PROGRESS NOTES
DERMATOLOGY NOTE  FOLLOW UP VISIT       Chief complaint: Follow-Up    Lesions on face       History of skin cancer: No  History of precancers/actinic keratoses: Yes, Details: AK's. Patient completed course of Efudex 5% cream applied to face and forehead 03/17/20- 04/28/20  History of biopsies:No  History of blistering/severe sunburns:Yes, Details: teenager  Family history of skin cancer:No  Family history of atypical moles:No      No Known Allergies     MEDICATIONS:  Medications relevant to specialty reviewed.     REVIEW OF SYSTEMS:   Positive for skin (see HPI)  Negative for fevers and chills       EXAM:  There were no vitals taken for this visit.  Constitutional: Well-developed, well-nourished, and in no distress.     A focused skin exam was performed including the affected areas of the face. Notable findings on exam today listed below and/or in assessment/plan.     Ill-defined erythematous gritty/scaly papules over the forehead, bilateral cheeks, L preauricular region  Few tan stuck-on waxy papules scattered on the face          IMPRESSION / PLAN:    1. Actinic keratosis  CRYOTHERAPY:  Risks (including, but not limited to: skin discoloration, redness, blister, blood blister, recurrence, need for further treatment, infection, scar) and benefits of cryotherapy discussed. Patient verbally agreed to proceed with treatment. 1 cryotherapy freeze thaw cycles of 10 seconds were applied to 4 lesions on face as noted on exam with cryac. Patient tolerated procedure well. Aftercare instructions given--no specific care needed unless irritated during healing process, can apply Vaseline with small band-aid if needed.      2. SK (seborrheic keratosis)  - Benign-appearing nature of lesions discussed during exam.   - Reassurance provided  - Advised to continue to monitor for any return to clinic for new or concerning changes.          Pt understands risks associated with LN2,   Patient verbalized understanding and agrees with plan  regarding the above            Please note that this dictation was created using voice recognition software. I have made every reasonable attempt to correct obvious errors, but I expect that there are errors of grammar and possibly content that I did not discover before finalizing the note.      Return to clinic in: Return for PRN and at regularly scheduled TSEs. and as needed for any new or changing skin lesions.

## 2025-02-13 DIAGNOSIS — G47.33 OSA ON CPAP: ICD-10-CM

## 2025-02-20 DIAGNOSIS — G47.33 OSA ON CPAP: ICD-10-CM

## 2025-02-20 NOTE — PROGRESS NOTES
Patient needs new referral for pulmonary sleep medicine.  Dr. Randa Roa is no longer with the company he saw.  He wants to see another group.  I did put in the referral for him per his request today

## 2025-03-17 ENCOUNTER — OFFICE VISIT (OUTPATIENT)
Dept: DERMATOLOGY | Facility: IMAGING CENTER | Age: 70
End: 2025-03-17
Payer: MEDICARE

## 2025-03-17 DIAGNOSIS — D18.01 CHERRY ANGIOMA: ICD-10-CM

## 2025-03-17 DIAGNOSIS — L81.4 LENTIGINES: ICD-10-CM

## 2025-03-17 DIAGNOSIS — D22.9 MULTIPLE NEVI: ICD-10-CM

## 2025-03-17 DIAGNOSIS — L82.1 SK (SEBORRHEIC KERATOSIS): ICD-10-CM

## 2025-03-17 DIAGNOSIS — D23.9 DERMATOFIBROMA: ICD-10-CM

## 2025-03-17 DIAGNOSIS — L91.8 ACROCHORDON: ICD-10-CM

## 2025-03-17 DIAGNOSIS — L57.0 ACTINIC KERATOSIS: ICD-10-CM

## 2025-03-17 DIAGNOSIS — Z12.83 SKIN CANCER SCREENING: ICD-10-CM

## 2025-03-17 PROCEDURE — 99212 OFFICE O/P EST SF 10 MIN: CPT | Performed by: NURSE PRACTITIONER

## 2025-03-17 NOTE — PROGRESS NOTES
DERMATOLOGY NOTE  FOLLOW UP VISIT       Chief complaint: Annual Exam    Denies new, growing, changing, itching or bleeding skin lesions today.        Previous HPI/location: Cyst on Penis  Time present: Was taken out 30 years ago. Seems like its coming back  Painful lesion: No  Itching lesion: No  Enlarging lesion: Yes      History of skin cancer: No  History of precancers/actinic keratoses: Yes, Details: AK's. Patient completed course of Efudex 5% cream applied to face and forehead 03/17/20- 04/28/20  History of biopsies:No  History of blistering/severe sunburns:Yes, Details: teenager  Family history of skin cancer:No  Family history of atypical moles:No      No Known Allergies     MEDICATIONS:  Medications relevant to specialty reviewed.     REVIEW OF SYSTEMS:   Positive for skin (see HPI)  Negative for fevers and chills       EXAM:  There were no vitals taken for this visit.  Constitutional: Well-developed, well-nourished, and in no distress.     A total body skin exam was performed including the genitals per patient preference and including the following areas: head (including face), neck, chest, abdomen, groin/buttocks, back, bilateral upper extremities, and bilateral lower extremities with the following pertinent findings listed below and/or in assessment/plan.     -sun exposed skin of trunk and b/l upper, lower extremities and face with scattered clinically benign light brown reticulated macules all of which were morphologically similar and none of which were suspicious for skin cancer today on exam    -Several tan skin-colored, medium brown stuck-on waxy papules scattered on the face, trunk and extremities    -Multiple tan medium brown skin-colored macules papules scattered over the trunk, face and extremities, All with benign-appearing pigment network patterns on dermoscopy    -Several scattered 1-3mm bright red macules and thin papules on the face, trunk and extremities    -1-2mm skin-colored to  hyperpigmented, soft, pedunculated papules bilateral axillae    -DF Left lateral lower extremitiy    -Ill-defined erythematous gritty/scaly papule over the Forehead, bilateral temples and cheeks        IMPRESSION / PLAN:    1. Actinic keratosis  Due to number of lesion, recommend field treatment  Discussed PDT vs topical  Pt opts for PDT, PA form completed      2. Lentigines  - Benign-appearing nature of lesions discussed during exam.   - Advised to continue to monitor for any return to clinic for new or concerning changes.      3. Cherry angioma  - Benign-appearing nature of lesions discussed during exam.   - Advised to continue to monitor for any return to clinic for new or concerning changes.      4. Multiple nevi  - Benign-appearing nature of lesions discussed during exam.   - Advised to continue to monitor for any return to clinic for new or concerning changes.  - ABCDE's of melanoma discussed/handout given      5. SK (seborrheic keratosis)  - Benign-appearing nature of lesions discussed during exam.   - Advised to continue to monitor for any return to clinic for new or concerning changes.      6. Acrochordon  - Benign-appearing nature of lesions discussed during exam.   - Advised to continue to monitor for any return to clinic for new or concerning changes.      7. Dermatofibroma  - Benign-appearing nature of lesions discussed during exam.   - Advised to continue to monitor for any return to clinic for new or concerning changes.      8. Skin cancer screening  Skin cancer education  discussed importance of sun protective clothing, eyewear in addition to the use of broad spectrum sunscreen with SPF 30 or greater, as well as need for reapplication ~every 2 hours when exposed to UVR/handout previously given  discussed importance following up for any new or changing lesions as noted in handout given, but every 12 months exams in clinic in the setting of dermatologic history  ABCDE's of melanoma discussed/handout  previously  given        I have performed a physical exam and reviewed and updated ROS and Plan today (3/17/2025). In review of dermatology visit (3/7/2024), there are no changes except as documented above.         Please note that this dictation was created using voice recognition software. I have made every reasonable attempt to correct obvious errors, but I expect that there are errors of grammar and possibly content that I did not discover before finalizing the note.      Return to clinic in: Return in about 1 year (around 3/17/2026) for TRINITY. and as needed for any new or changing skin lesions.

## 2025-03-31 ENCOUNTER — OFFICE VISIT (OUTPATIENT)
Dept: DERMATOLOGY | Facility: IMAGING CENTER | Age: 70
End: 2025-03-31
Payer: MEDICARE

## 2025-03-31 DIAGNOSIS — L57.0 ACTINIC KERATOSIS: ICD-10-CM

## 2025-03-31 NOTE — PROGRESS NOTES
Patient scheduled for  EGD    Indication for procedure. Variceal surveillance    Referring Provider. Dr. Wills    ? no    Arrival time verified? 7:00 am    Facility location verified? [address] [facility], room 1-301    Instructions given regarding prep and procedure    Prep Type NPO after midnight    Are you taking any anticoagulants or blood thinners? no    Instructions given? yes    Electronic implanted devices? no    Pre procedure teaching completed? Yes    Transportation from procedure? Yes, wife    H&P / Pre op physical completed? Na    Denise Bansal RN           S: Patient here for field treatment of actinic keratosis on face  PDT has previously been discussed in detail (vs. Cryo vs topical at home field therapy)     O: face with scattered gritty/erythematous papules     A/P: Actinic keratoses  I counseled the patient regarding the following:  The risks of PDT, including, but not limited to, pigmentary changes, pain, blistering, scabbing, redness, remote possibility of scarring, failed treatment, were discussed.   Post care discussed, including the absolute need to avoid sunlight for 2 days (48 hours) post-procedure, and the need to wear sun protection. Patient may experience sunburn like redness, discomfort, swelling, and scabbing. Must wear zinc oxide sunscreen at all times during the week of healing.    Patient aware to call me with any questions or concerns.  See ALA + blue light treatment was administered per treatment protocol scanned into patient chart (see attached).     RTC 3 months for post-procedure check     DOTTIE Leo, CANDIEP-C

## 2025-04-01 ENCOUNTER — TELEPHONE (OUTPATIENT)
Dept: FAMILY PLANNING/WOMEN'S HEALTH CLINIC | Facility: PHYSICIAN GROUP | Age: 70
End: 2025-04-01
Payer: MEDICARE

## 2025-04-02 NOTE — Clinical Note
[facility]  [address] [address]    [id number]    Hussein Sukumar Sarmiento Jr.  [address] [place] [address]    April 2, 2025    Member Name: Hussein Sarmiento JrEstevan   Member Number: [id number]   Reference Number: [id number]   Approved Services: ENT Services   Approved Service Dates: 04/02/2025 - 07/01/2025   Requesting Provider: Jaylene Villanueva   Requested Provider: Jaylene Villanueva     Dear Hussein Sarmiento Jr.:    The following medical service(s) requested by Jaylene Villanueva have been approved:    Procedure Code Procedure Code Name Requested Quantity Approved Quantity Status   04858 (CPT®) MO CT SCAN,MAXILLOFACIAL AREA,W/O CONTRAST 1 1 Authorized       Approved Quantity means the number of visits approved for medication treatments and/or medical services.    The services should be provided by Jaylene Villanueva no later than 07/01/2025. Please contact the provider listed below with any questions.     Provider Information:  Jaylene Villanueva  [phone]    Your plan benefit may require a deductible, co-payment or coinsurance for these services. This authorization does not guarantee [facility] will pay the claim for services that you receive. Payment by [facility] for these services is subject to the terms of your Evidence of Coverage, your eligibility at the time of service, and confirmation of benefit coverage.    For any questions or additional information, please contact Customer Service:    [facility] Toll Free: [phone]  [employer] users dial: 711   Call Center Hours:  Oct 1 - Mar 31, Mon - Fri 7 AM to 8 PM PST  Oct 1 - Mar 31, Sat - Sun 8 AM to 8 PM PST  Apr 1 - Sep 30, Mon - Fri 7 AM to 8 PM PST   Office Hours: Mon - Fri 8 AM to 5 PM PST   E-mail: [email or web address]   Website:  [email or web address]      This information is available for free in other languages. Please contact Customer Service at the phone number above for more  information. [facility] complies with applicable Federal civil rights laws and does not discriminate on the basis of race, color, national origin, age, disability or sex.    Sincerely,     Healthcare Utilization Management Department     Cc: Jaylene Villanueva    Multi-Language Insert  Multi- Services  English: We have free  services to answer any questions you may have about our health or drug plan.  To get an , just call us at [phone].  Someone who speaks English/Language can help you.  This is a free service.  Kosovan: Tenemos servicios de intérprete sin costo alguno  para responder cualquier pregunta que pueda tener sobre nuestro plan de erica o medicamentos. Para hablar con un intérprete, por favor llame al [phone]. Alguien que hable español le podrá ayudar. Annette es un servicio gratuito.  Chinese Mandarin: ?????????????????????????????? ???????????????? [phone]????????????????? ?????????  Chinese Cantonese: ?????????????????????????????? ????????????? [phone]???????????????????? ????????  Tagalog:  Nader hurley serbisyo sa cuadrasasaling-juve johnsona obdulia bonilla hinggil sa osmany toroong kristagregi o panggamot.  Dami morales tagasaling-joanna ani  [phone]. Len morales Tagalog.  Jasper singh.  Romansh:  Nous proposons jenniffer services gratuits d'interprétation pour répondre à toutes rahat questions relatives à notre régime de santé ou d'assurance-médicaments. Pour accéder au service d'interprétation, il vous suffit de nous appeler au [phone]. Un interlocuteur [facility] pourra vous aider. Ce service est gratuit.  Ukrainian:  Shaji zacarias có d?ch v? thông d?ch mi?n phí ð? tr? l?i các câu h?i v? chýõng s?c kh?e và chýõng trình thu?c men. N?u quí v? c?n thông d?ch viên eloise g?i [phone] s? có nhân viên nói  ti?ng Vi?t giúp ð? quí v?. Ðây là d?ch v? mi?n phí .  Armenian:  Unser kostenser Dolmetscherservice beantwortet Ihren Fragen zu unserem Gesundheits- und Arzneimittelplan. Unsere Dolmetscher erreichen Sie [phone]. Man wird Ihnen tung auf [facility]. Dieser Service ist [facility].  Sinhala:  ??? ?? ?? ?? ?? ??? ?? ??? ?? ???? ?? ?? ???? ???? ????. ?? ???? ????? ?? [phone] ??? ??? ????.  ???? ?? ???? ?? ?? ????. ? ???? ??? ?????.   Mexican: Åñëè ó âàñ âîçíèêíóò âîïðîñû îòíîñèòåëüíî ñòðàõîâîãî èëè ìåäèêàìåíòíîãî ïëàíà, âû ìîæåòå âîñïîëüçîâàòüñÿ íàøèìè áåñïëàòíûìè óñëóãàìè ïåðåâîä÷èêîâ. ×òîáû âîñïîëüçîâàòüñÿ óñëóãàìè ïåðåâîä÷èêà, ïîçâîíèòå íàì ïî òåëåôîíó [phone]. Âàì îêàæåò ïîìîùü ñîòðóäíèê, êîòîðûé ãîâîðèò ïî-póññêè. Äàííàÿ óñëóãà áåñïëàòíàÿ.  Nepali: ÅääÇ äÞÏã ÎÏãÇÊ ÇáãÊÑÌã ÇáÝæÑí ÇáãÌÇäíÉ ááÅÌÇÈÉ Úä Ãí ÃÓÆáÉ ÊÊÚáÞ ÈÇáÕÍÉ Ãæ ÌÏæá ÇáÃÏæíÉ áÏíäÇ. ááÍÕæá Úáì ãÊÑÌã ÝæÑí¡ áíÓ Úáíß Óæì ÇáÇÊÕÇá ÈäÇ Úáì [phone] . ÓíÞæã ÔÎÕ ãÇ íÊÍÏË ÇáÚÑÈíÉ ÈãÓÇÚÏÊß. åÐå ÎÏãÉ ãÌÇäíÉ.  Gertrudis: ????? ????????? ?? ??? ?? ????? ?? ???? ??? ???? ???? ?? ?????? ?? ???? ???? ?? ??? ????? ??? ????? ???????? ?????? ?????? ???. ?? ???????? ??????? ???? ?? ???, ?? ???? [phone] ?? ??? ????. ??? ??????? ?? ?????? ????? ?? ???? ??? ?? ???? ??. ?? ?? ????? ???? ??.   Bahamian:  È disponibile un servizio di interpretariato gratuito per rispondere a eventuali domande sul nostro piano sanitario e farmaceutico. Per un interprete, contattare il nayeli [phone]. Un nostro incaricato gauri parla Italianovi fornirà l'assistenza necessaria. È un servizio gratuito.  Portugués:  Dispomos de serviços de interpretação gratuitos para responder a qualquer questão que tenha acerca do nosso plano de saúde ou de medicação. Para obter um intérprete, contacte-nos através do número [phone]. Irá encontrar alguém que fale o idioma  Português para o ajudar. Annette serviço é gratuito.  Setswana Creole:  Nou genyen cristianois entèprèt josé  roscoe reponn tout kesyon ou ta genyen konsènan plan medikal oswa dwòg nou an.  Roscoe jwenn yon entèprèt, jis rele nou nan [phone]. Yocharlie jean pale Kreyòl kapab beny w.  Sa a se yon sèvis ki gratis.  Polish:  Umo¿liwiamy bezp³atne skorzystanie z us³ug t³umacza ustnego, który pomo¿e w uzyskaniu odpowiedzi na temat planu zdrowotnego lub dawkowania leków. Jennifer skorzystaæ z pomocy t³umacza znaj¹cego sandrita mccurdy¿y zadzwoniæ pod numer [phone]. Ta us³uga jest bezp³atna.  Lithuanian: ????? ??????? ????????????????????? ??????????????????????????????????[phone] ???????????????? ? ????????????????? ?????

## 2025-04-07 ASSESSMENT — ENCOUNTER SYMPTOMS: GENERAL WELL-BEING: EXCELLENT

## 2025-04-07 ASSESSMENT — ACTIVITIES OF DAILY LIVING (ADL): BATHING_REQUIRES_ASSISTANCE: 0

## 2025-04-09 PROBLEM — E78.00 PURE HYPERCHOLESTEROLEMIA: Status: RESOLVED | Noted: 2023-08-10 | Resolved: 2025-04-09

## 2025-04-09 PROBLEM — E78.2 MIXED HYPERLIPIDEMIA: Status: ACTIVE | Noted: 2020-02-04

## 2025-04-09 NOTE — ASSESSMENT & PLAN NOTE
Chronic, stable. He does not take any lipid lowering medications. He had an essentially negative Ct cardiac score (8) in Aug 2024. We discussed his dietary/lifestyle regimen. Follow up with PCP for continued monitoring and management.  Lab Results   Component Value Date/Time    CHOLSTRLTOT 225 (H) 08/02/2024 06:34 AM    TRIGLYCERIDE 193 (H) 08/02/2024 06:34 AM    HDL 54 08/02/2024 06:34 AM     (H) 08/02/2024 06:34 AM

## 2025-04-09 NOTE — ASSESSMENT & PLAN NOTE
Chronic, stable. Last GFR in 2024 was 58. He does not follow with nephrology. We discussed avoiding nephrotoxic medication such as NSAIDs as well as maintaining adequate hydration. Follow up with PCP for continued monitoring.

## 2025-04-09 NOTE — ASSESSMENT & PLAN NOTE
Chronic, stable. Compliant with CPAP at night. Does not require supplemental O2. Follows with pulmonology.

## 2025-04-10 ENCOUNTER — OFFICE VISIT (OUTPATIENT)
Dept: FAMILY PLANNING/WOMEN'S HEALTH CLINIC | Facility: PHYSICIAN GROUP | Age: 70
End: 2025-04-10
Payer: MEDICARE

## 2025-04-10 VITALS
DIASTOLIC BLOOD PRESSURE: 70 MMHG | BODY MASS INDEX: 28.34 KG/M2 | HEIGHT: 68 IN | SYSTOLIC BLOOD PRESSURE: 120 MMHG | WEIGHT: 187 LBS | OXYGEN SATURATION: 95 % | HEART RATE: 70 BPM

## 2025-04-10 DIAGNOSIS — E78.2 MIXED HYPERLIPIDEMIA: ICD-10-CM

## 2025-04-10 DIAGNOSIS — N18.31 STAGE 3A CHRONIC KIDNEY DISEASE: ICD-10-CM

## 2025-04-10 DIAGNOSIS — G47.33 OSA ON CPAP: ICD-10-CM

## 2025-04-10 PROCEDURE — 3078F DIAST BP <80 MM HG: CPT

## 2025-04-10 PROCEDURE — 3074F SYST BP LT 130 MM HG: CPT

## 2025-04-10 PROCEDURE — G0439 PPPS, SUBSEQ VISIT: HCPCS

## 2025-04-10 RX ORDER — CEFDINIR 300 MG/1
300 CAPSULE ORAL 2 TIMES DAILY
COMMUNITY

## 2025-04-10 SDOH — ECONOMIC STABILITY: HOUSING INSECURITY: AT ANY TIME IN THE PAST 12 MONTHS, WERE YOU HOMELESS OR LIVING IN A SHELTER (INCLUDING NOW)?: NO

## 2025-04-10 SDOH — ECONOMIC STABILITY: FOOD INSECURITY: HOW HARD IS IT FOR YOU TO PAY FOR THE VERY BASICS LIKE FOOD, HOUSING, MEDICAL CARE, AND HEATING?: NOT HARD AT ALL

## 2025-04-10 SDOH — ECONOMIC STABILITY: TRANSPORTATION INSECURITY: IN THE PAST 12 MONTHS, HAS LACK OF TRANSPORTATION KEPT YOU FROM MEDICAL APPOINTMENTS OR FROM GETTING MEDICATIONS?: NO

## 2025-04-10 SDOH — ECONOMIC STABILITY: FOOD INSECURITY: WITHIN THE PAST 12 MONTHS, THE FOOD YOU BOUGHT JUST DIDN'T LAST AND YOU DIDN'T HAVE MONEY TO GET MORE.: NEVER TRUE

## 2025-04-10 SDOH — ECONOMIC STABILITY: HOUSING INSECURITY: IN THE PAST 12 MONTHS, HOW MANY TIMES HAVE YOU MOVED WHERE YOU WERE LIVING?: 1

## 2025-04-10 SDOH — ECONOMIC STABILITY: HOUSING INSECURITY: IN THE LAST 12 MONTHS, WAS THERE A TIME WHEN YOU WERE NOT ABLE TO PAY THE MORTGAGE OR RENT ON TIME?: NO

## 2025-04-10 SDOH — ECONOMIC STABILITY: FOOD INSECURITY: WITHIN THE PAST 12 MONTHS, YOU WORRIED THAT YOUR FOOD WOULD RUN OUT BEFORE YOU GOT THE MONEY TO BUY MORE.: NEVER TRUE

## 2025-04-10 ASSESSMENT — FIBROSIS 4 INDEX: FIB4 SCORE: 1.82

## 2025-04-10 ASSESSMENT — ACTIVITIES OF DAILY LIVING (ADL): LACK_OF_TRANSPORTATION: NO

## 2025-04-10 ASSESSMENT — PATIENT HEALTH QUESTIONNAIRE - PHQ9: CLINICAL INTERPRETATION OF PHQ2 SCORE: 0

## 2025-04-10 NOTE — PROGRESS NOTES
Comprehensive Health Assessment Program     Hussein Sarmiento Jr. is a 70 y.o. here for his comprehensive health assessment.    Patient Active Problem List    Diagnosis Date Noted    Agatston coronary artery calcium score less than 100 08/14/2024    Lateral epicondylitis of left elbow 03/07/2024    Abnormal CBC 08/10/2023    Prediabetes 02/23/2023    Stage 3 chronic kidney disease 10/29/2020    Decreased hearing, left 02/18/2020    Anemia, unspecified 02/15/2020    Mixed hyperlipidemia 02/04/2020    Hypovitaminosis D 02/04/2020    Actinic keratosis 02/04/2020    KORIN on CPAP 02/04/2020    Medicare annual wellness visit, subsequent 02/04/2020       Current Outpatient Medications   Medication Sig Dispense Refill    cefdinir (OMNICEF) 300 MG Cap Take 300 mg by mouth 2 times a day.      Cholecalciferol (VITAMIN D3) 50 MCG (2000 UT) Tab Take  by mouth.       No current facility-administered medications for this visit.          Current supplements as per medication list.     Allergies:   Patient has no known allergies.  Social History     Tobacco Use    Smoking status: Never    Smokeless tobacco: Never   Vaping Use    Vaping status: Never Used   Substance Use Topics    Alcohol use: Yes     Comment: 2 drinks a day    Drug use: Never     Family History   Problem Relation Age of Onset    Heart Disease Mother     Colon Cancer Father         colon    Diabetes Sister     Hypertension Sister     Hyperlipidemia Sister     Sleep Apnea Sister     Diabetes Brother     Hypertension Brother     Hyperlipidemia Brother     Sleep Apnea Brother      Hussein  has a past medical history of Actinic keratosis, Chickenpox, Dyslipidemia, Trin[place] measles, and Sleep apnea.   Past Surgical History:   Procedure Laterality Date    TONSILLECTOMY         Screening:  In the last six months have you experienced any leakage of urine? No    Depression Screening  Little interest or pleasure in doing things?  0 - not at all  Feeling down, depressed ,  or hopeless? 0 - not at all  Trouble falling or staying asleep, or sleeping too much?     Feeling tired or having little energy?     Poor appetite or overeating?     Feeling bad about yourself - or that you are a failure or have let yourself or your family down?    Trouble concentrating on things, such as reading the newspaper or watching television?    Moving or speaking so slowly that other people could have noticed.  Or the opposite - being so fidgety or restless that you have been moving around a lot more than usual?     Thoughts that you would be better off dead, or of hurting yourself?     Patient Health Questionnaire Score:      If depressive symptoms identified deferred to follow up visit unless specifically addressed in assessment and plan.    Interpretation of PHQ-9 Total Score   Score Severity   1-4 No Depression   5-9 Mild Depression   10-14 Moderate Depression   15-19 Moderately Severe Depression   20-27 Severe Depression    Screening for Cognitive Impairment  Do you or any of your friends or family members have any concern about your memory? No  Three Minute Recall (Village, Kitchen, Baby) 3/3    Jono clock face with all 12 numbers and set the hands to show 10 minutes past 11.  Yes    Cognitive concerns identified deferred for follow up unless specifically addressed in assessment and plan.    Fall Risk Assessment  Has the patient had two or more falls in the last year or any fall with injury in the last year?  No    Safety Assessment  Do you always wear your seatbelt?  Yes  Any changes to home needed to function safely? No  Difficulty hearing.  No  Patient counseled about all safety risks that were identified.    Functional Assessment ADLs  Are there any barriers preventing you from cooking for yourself or meeting nutritional needs?  No.    Are there any barriers preventing you from driving safely or obtaining transportation?  No.    Are there any barriers preventing you from using a telephone or  calling for help?  No    Are there any barriers preventing you from shopping?  No.    Are there any barriers preventing you from taking care of your own finances?  No    Are there any barriers preventing you from managing your medications?  No    Are there any barriers preventing you from showering, bathing or dressing yourself? No    Are there any barriers preventing you from doing housework or laundry? No    Are there any barriers preventing you from using the toilet?No    Are you currently engaging in any exercise or physical activity?  Yes.  Goes to the gym once a week, walks a mile and a half daily with his dog.     Self-Assessment of Health  What is your perception of your health? Excellent    Do you sleep more than six hours a night? Yes    In the past 7 days, how much did pain keep you from doing your normal work? None    Do you spend quality time with family or friends (virtually or in person)? Yes    Do you usually eat a heart healthy diet that constists of a variety of fruits, vegetables, whole grains and fiber? Yes    Do you eat foods high in fat and/or Fast Food more than three times per week? Yes    How concerned are you that your medical conditions are not being well managed? Not at all    Are you worried that in the next 2 months, you may not have stable housing that you own, rent, or stay in as part of a household? No        Advance Care Planning  Do you have an Advance Directive, Living Will, Durable Power of , or POLST? Yes    Living Will Durable Power of    is not on file - instructed patient to bring in a copy to scan into their chart      Health Maintenance Summary            Upcoming       Zoster (Shingles) Vaccines (2 of 3) Postponed until 9/10/2025      02/04/2015  Imm Admin: Zoster Vaccine Live (ZVL) (Zostavax) - HISTORICAL DATA              COVID-19 Vaccine (3 - 2024-25 season) Postponed until 4/10/2026      04/23/2021  Imm Admin: PFIZER PURPLE CAP SARS-COV-2 VACCINATION  (12+)    2021  Imm Admin: PFIZER PURPLE CAP SARS-COV-2 VACCINATION (12+)              Pneumococcal Vaccine: 50+ Years (1 of 1 - PCV) Postponed until 4/10/2026     No completion history exists for this topic.              IMM DTaP/Tdap/Td Vaccine (2 - Td or Tdap) Next due on 2015  Imm Admin: Tdap Vaccine              Influenza Vaccine (Season Ended) Next due on 2021  Imm Admin: Influenza Vaccine Adult HD    10/16/2020  Imm Admin: Influenza Vaccine Adult HD    2020  Imm Admin: Influenza Vaccine Adult HD    11/10/2015  Imm Admin: Influenza Vaccine Quad Inj (Pf)    2013  Imm Admin: Influenza Seasonal Injectable - Historical Data     Only the first 5 history entries have been loaded, but more history exists.            Annual Wellness Visit (Yearly) Next due on 4/10/2026      04/10/2025  Level of Service: [place] ANNUAL WELLNESS VISIT-INCLUDES PPPS SUBSEQUE*    07/10/2024  Level of Service: [place] ANNUAL WELLNESS VISIT-INCLUDES PPPS SUBSEQUE*    08/10/2023  Level of Service: [place] ANNUAL WELLNESS VISIT-INCLUDES PPPS SUBSEQUE*    2022  Visit Dx: Medicare annual wellness visit, subsequent    2022  Subsequent Annual Wellness Visit - Includes PPPS ()      Only the first 5 history entries have been loaded, but more history exists.              Colorectal Cancer Screening (Colonoscopy - Every 10 Years) Next due on 2024  Colonoscopy (Done - see in media- 10 yr recall)    2022  OCCULT BLOOD FECES IMMUNOASSAY    2020  OCCULT BLOOD FECES IMMUNOASSAY    2014  AMB REFERRAL TO GI FOR COLONOSCOPY                      Completed or No Longer Recommended       Hepatitis C Screening  Completed      2020  Hepatitis C Antibody component of HCV Scrn ( 3543-2593 1xLife)              Hepatitis A Vaccine (Hep A) (Series Information) Aged Out      No completion history exists for this topic.              Hepatitis B Vaccine (Hep B)  "(Series Information) Aged Out     No completion history exists for this topic.              HPV Vaccines (Series Information) Aged Out     No completion history exists for this topic.              Polio Vaccine (Inactivated Polio) (Series Information) Aged Out     No completion history exists for this topic.              Meningococcal Immunization (Series Information) Aged Out     No completion history exists for this topic.                            Patient Care Team:  Bharti Cooper P.A.-C. as PCP - General (Family Medicine)  Jessica Loza M.D. as PCP - [facility] Paneled  Juan Guerrero Ass't as    Preferred (DME Supplier)  Dr. Jaylene Villanueva MD [facility] as Attending Team Physician (Otolaryngology)    Financial Resource Strain: Low Risk  (4/10/2025)    Overall Financial Resource Strain (CARDIA)     Difficulty of Paying Living Expenses: Not hard at all      Transportation Needs: No Transportation Needs (4/10/2025)    PRAPARE - Transportation     Lack of Transportation (Medical): No     Lack of Transportation (Non-Medical): No      Food Insecurity: No Food Insecurity (4/10/2025)    Hunger Vital Sign     Worried About Running Out of Food in the Last Year: Never true     Ran Out of Food in the Last Year: Never true        Encounter Vitals  Blood Pressure : 120/70  Pulse: 70  Pulse Oximetry: 95 %  Weight: 84.8 kg (187 lb)  Height: 172.7 cm (5' 8\")  BMI (Calculated): 28.43     Physical Exam  Constitutional:       Appearance: Normal appearance.   HENT:      Head: Normocephalic and atraumatic.   Eyes:      Extraocular Movements: Extraocular movements intact.      Pupils: Pupils are equal, round, and reactive to light.   Cardiovascular:      Rate and Rhythm: Normal rate and regular rhythm.      Heart sounds: Normal heart sounds.   Pulmonary:      Breath sounds: Normal breath sounds.   Musculoskeletal:      Right lower leg: No edema.      Left lower leg: No edema. "   Neurological:      Mental Status: He is alert and oriented to person, place, and time.           Assessment and Plan. The following treatment and monitoring plan is recommended:  Mixed hyperlipidemia  Chronic, stable. He does not take any lipid lowering medications. He had an essentially negative Ct cardiac score (8) in Aug 2024. We discussed his dietary/lifestyle regimen. Follow up with PCP for continued monitoring and management.  Lab Results   Component Value Date/Time    CHOLSTRLTOT 225 (H) 08/02/2024 06:34 AM    TRIGLYCERIDE 193 (H) 08/02/2024 06:34 AM    HDL 54 08/02/2024 06:34 AM     (H) 08/02/2024 06:34 AM         KORIN on CPAP  Chronic, stable. Compliant with CPAP at night. Does not require supplemental O2. Follows with pulmonology.         Stage 3 chronic kidney disease  Chronic, stable. Last GFR in 2024 was 58. He does not follow with nephrology. We discussed avoiding nephrotoxic medication such as NSAIDs as well as maintaining adequate hydration. Follow up with PCP for continued monitoring.      Services suggested: No services needed at this time  Health Care Screening: Age-appropriate preventive services recommended by USPTF and ACIP covered by Medicare were discussed today. Services ordered if indicated and agreed upon by the patient.  Referrals offered: Community-based lifestyle interventions to reduce health risks and promote self-management and wellness, fall prevention, nutrition, physical activity, tobacco-use cessation, weight loss, and mental health services as per orders if indicated.    Discussion today about general wellness and lifestyle habits:    Prevent falls and reduce trip hazards; Cautioned about securing or removing rugs.  Have a working fire alarm and carbon monoxide detector.  Engage in regular physical activity and social activities.    Follow-up: Return for appointment with Primary Care Provider as needed..

## 2025-06-30 ENCOUNTER — OFFICE VISIT (OUTPATIENT)
Dept: DERMATOLOGY | Facility: IMAGING CENTER | Age: 70
End: 2025-06-30
Payer: MEDICARE

## 2025-06-30 DIAGNOSIS — L57.0 ACTINIC KERATOSIS: Primary | ICD-10-CM

## 2025-06-30 DIAGNOSIS — L82.1 SK (SEBORRHEIC KERATOSIS): ICD-10-CM

## 2025-06-30 NOTE — PROGRESS NOTES
DERMATOLOGY NOTE  FOLLOW UP VISIT       Chief complaint: Follow-Up  Patient is here today for a follow-up post PDT. Denies new, growing, changing, itching or bleeding skin lesions today.         Previous HPI/location: Cyst on Penis  Time present: Was taken out 30 years ago. Seems like its coming back  Painful lesion: No  Itching lesion: No  Enlarging lesion: Yes      History of skin cancer: No  History of precancers/actinic keratoses: Yes, Details: AK's. Patient completed course of Efudex 5% cream applied to face and forehead 03/17/20- 04/28/20  History of biopsies:No  History of blistering/severe sunburns:Yes, Details: teenager  Family history of skin cancer:No  Family history of atypical moles:No      No Known Allergies     MEDICATIONS:  Medications relevant to specialty reviewed.     REVIEW OF SYSTEMS:   Positive for skin (see HPI)  Negative for fevers and chills       EXAM:  There were no vitals taken for this visit.  Constitutional: Well-developed, well-nourished, and in no distress.     A focused  skin exam was performed including following: face with the following pertinent findings listed below and/or in assessment/plan.         -2 remaining ill-defined erythematous gritty/scaly papules over the forehead        IMPRESSION / PLAN:    1. Actinic keratosis  CRYOTHERAPY:  Risks (including, but not limited to: skin discoloration, redness, blister, blood blister, recurrence, need for further treatment, infection, scar) and benefits of cryotherapy discussed. Patient verbally agreed to proceed with treatment. 1 cryotherapy freeze thaw cycles of 10 seconds were applied to 2 lesions on face as noted on exam with cryac. Patient tolerated procedure well. Aftercare instructions given--no specific care needed unless irritated during healing process, can apply Vaseline with small band-aid if needed.      2. SK (seborrheic keratosis)  - Benign-appearing nature of lesions discussed during exam.   - Courtesy LNHeriberto applied to 2  SKs on face for cosmesis  - Advised to continue to monitor for any return to clinic for new or concerning changes.        Pt understands risks associated with LN2,   Patient verbalized understanding and agrees with plan regarding the above        Please note that this dictation was created using voice recognition software. I have made every reasonable attempt to correct obvious errors, but I expect that there are errors of grammar and possibly content that I did not discover before finalizing the note.      Return to clinic in: Return for March 2026 [facility]. and as needed for any new or changing skin lesions.

## 2025-08-04 ENCOUNTER — HOSPITAL ENCOUNTER (OUTPATIENT)
Facility: MEDICAL CENTER | Age: 70
End: 2025-08-04
Attending: PHYSICIAN ASSISTANT
Payer: MEDICARE

## 2025-08-04 DIAGNOSIS — E78.00 PURE HYPERCHOLESTEROLEMIA: ICD-10-CM

## 2025-08-04 LAB
CHOLEST SERPL-MCNC: 202 MG/DL (ref 100–199)
FASTING STATUS PATIENT QL REPORTED: NORMAL
HDLC SERPL-MCNC: 44 MG/DL
LDLC SERPL CALC-MCNC: 115 MG/DL
TRIGL SERPL-MCNC: 215 MG/DL (ref 0–149)

## 2025-08-04 PROCEDURE — 80061 LIPID PANEL: CPT

## 2025-08-04 PROCEDURE — 36415 COLL VENOUS BLD VENIPUNCTURE: CPT

## 2025-08-09 SDOH — ECONOMIC STABILITY: FOOD INSECURITY: WITHIN THE PAST 12 MONTHS, THE FOOD YOU BOUGHT JUST DIDN'T LAST AND YOU DIDN'T HAVE MONEY TO GET MORE.: NEVER TRUE

## 2025-08-09 SDOH — HEALTH STABILITY: PHYSICAL HEALTH: ON AVERAGE, HOW MANY DAYS PER WEEK DO YOU ENGAGE IN MODERATE TO STRENUOUS EXERCISE (LIKE A BRISK WALK)?: 7 DAYS

## 2025-08-09 SDOH — ECONOMIC STABILITY: INCOME INSECURITY: IN THE LAST 12 MONTHS, WAS THERE A TIME WHEN YOU WERE NOT ABLE TO PAY THE MORTGAGE OR RENT ON TIME?: NO

## 2025-08-09 SDOH — ECONOMIC STABILITY: INCOME INSECURITY: HOW HARD IS IT FOR YOU TO PAY FOR THE VERY BASICS LIKE FOOD, HOUSING, MEDICAL CARE, AND HEATING?: NOT HARD AT ALL

## 2025-08-09 SDOH — ECONOMIC STABILITY: FOOD INSECURITY: WITHIN THE PAST 12 MONTHS, YOU WORRIED THAT YOUR FOOD WOULD RUN OUT BEFORE YOU GOT MONEY TO BUY MORE.: NEVER TRUE

## 2025-08-09 SDOH — HEALTH STABILITY: PHYSICAL HEALTH: ON AVERAGE, HOW MANY MINUTES DO YOU ENGAGE IN EXERCISE AT THIS LEVEL?: 50 MIN

## 2025-08-09 ASSESSMENT — LIFESTYLE VARIABLES
HOW OFTEN DO YOU HAVE SIX OR MORE DRINKS ON ONE OCCASION: NEVER
HOW MANY STANDARD DRINKS CONTAINING ALCOHOL DO YOU HAVE ON A TYPICAL DAY: 1 OR 2
HOW OFTEN DO YOU HAVE A DRINK CONTAINING ALCOHOL: 4 OR MORE TIMES A WEEK
SKIP TO QUESTIONS 9-10: 1
AUDIT-C TOTAL SCORE: 4

## 2025-08-09 ASSESSMENT — SOCIAL DETERMINANTS OF HEALTH (SDOH)
DO YOU BELONG TO ANY CLUBS OR ORGANIZATIONS SUCH AS CHURCH GROUPS UNIONS, FRATERNAL OR ATHLETIC GROUPS, OR SCHOOL GROUPS?: NO
WITHIN THE PAST 12 MONTHS, YOU WORRIED THAT YOUR FOOD WOULD RUN OUT BEFORE YOU GOT THE MONEY TO BUY MORE: NEVER TRUE
HOW OFTEN DO YOU ATTENT MEETINGS OF THE CLUB OR ORGANIZATION YOU BELONG TO?: MORE THAN 4 TIMES PER YEAR
HOW OFTEN DO YOU ATTEND CHURCH OR RELIGIOUS SERVICES?: NEVER
HOW OFTEN DO YOU HAVE SIX OR MORE DRINKS ON ONE OCCASION: NEVER
DO YOU BELONG TO ANY CLUBS OR ORGANIZATIONS SUCH AS CHURCH GROUPS UNIONS, FRATERNAL OR ATHLETIC GROUPS, OR SCHOOL GROUPS?: NO
IN A TYPICAL WEEK, HOW MANY TIMES DO YOU TALK ON THE PHONE WITH FAMILY, FRIENDS, OR NEIGHBORS?: MORE THAN THREE TIMES A WEEK
HOW OFTEN DO YOU HAVE A DRINK CONTAINING ALCOHOL: 4 OR MORE TIMES A WEEK
IN A TYPICAL WEEK, HOW MANY TIMES DO YOU TALK ON THE PHONE WITH FAMILY, FRIENDS, OR NEIGHBORS?: MORE THAN THREE TIMES A WEEK
IN THE PAST 12 MONTHS, HAS THE ELECTRIC, GAS, OIL, OR WATER COMPANY THREATENED TO SHUT OFF SERVICE IN YOUR HOME?: NO
HOW OFTEN DO YOU ATTEND CHURCH OR RELIGIOUS SERVICES?: NEVER
HOW HARD IS IT FOR YOU TO PAY FOR THE VERY BASICS LIKE FOOD, HOUSING, MEDICAL CARE, AND HEATING?: NOT HARD AT ALL
HOW OFTEN DO YOU GET TOGETHER WITH FRIENDS OR RELATIVES?: MORE THAN THREE TIMES A WEEK
HOW OFTEN DO YOU GET TOGETHER WITH FRIENDS OR RELATIVES?: MORE THAN THREE TIMES A WEEK
HOW OFTEN DO YOU ATTENT MEETINGS OF THE CLUB OR ORGANIZATION YOU BELONG TO?: MORE THAN 4 TIMES PER YEAR
HOW MANY DRINKS CONTAINING ALCOHOL DO YOU HAVE ON A TYPICAL DAY WHEN YOU ARE DRINKING: 1 OR 2

## 2025-08-12 ENCOUNTER — APPOINTMENT (OUTPATIENT)
Dept: MEDICAL GROUP | Age: 70
End: 2025-08-12
Payer: MEDICARE

## 2025-08-12 ENCOUNTER — HOSPITAL ENCOUNTER (OUTPATIENT)
Dept: LAB | Facility: MEDICAL CENTER | Age: 70
End: 2025-08-12
Attending: PHYSICIAN ASSISTANT
Payer: MEDICARE

## 2025-08-12 DIAGNOSIS — D50.9 IRON DEFICIENCY ANEMIA, UNSPECIFIED IRON DEFICIENCY ANEMIA TYPE: ICD-10-CM

## 2025-08-12 DIAGNOSIS — N18.31 STAGE 3A CHRONIC KIDNEY DISEASE: ICD-10-CM

## 2025-08-12 DIAGNOSIS — Z12.5 PROSTATE CANCER SCREENING: ICD-10-CM

## 2025-08-12 DIAGNOSIS — R73.09 ELEVATED GLUCOSE: ICD-10-CM

## 2025-08-12 LAB
25(OH)D3 SERPL-MCNC: 54 NG/ML (ref 30–100)
ALBUMIN SERPL BCP-MCNC: 4.3 G/DL (ref 3.2–4.9)
ALBUMIN/GLOB SERPL: 1.7 G/DL
ALP SERPL-CCNC: 62 U/L (ref 30–99)
ALT SERPL-CCNC: 18 U/L (ref 2–50)
ANION GAP SERPL CALC-SCNC: 15 MMOL/L (ref 7–16)
AST SERPL-CCNC: 30 U/L (ref 12–45)
BASOPHILS # BLD AUTO: 0.5 % (ref 0–1.8)
BASOPHILS # BLD: 0.04 K/UL (ref 0–0.12)
BILIRUB SERPL-MCNC: 0.4 MG/DL (ref 0.1–1.5)
BUN SERPL-MCNC: 19 MG/DL (ref 8–22)
CALCIUM ALBUM COR SERPL-MCNC: 9.7 MG/DL (ref 8.5–10.5)
CALCIUM SERPL-MCNC: 9.9 MG/DL (ref 8.5–10.5)
CHLORIDE SERPL-SCNC: 104 MMOL/L (ref 96–112)
CO2 SERPL-SCNC: 22 MMOL/L (ref 20–33)
CREAT SERPL-MCNC: 1.47 MG/DL (ref 0.5–1.4)
EOSINOPHIL # BLD AUTO: 0.13 K/UL (ref 0–0.51)
EOSINOPHIL NFR BLD: 1.7 % (ref 0–6.9)
ERYTHROCYTE [DISTWIDTH] IN BLOOD BY AUTOMATED COUNT: 44.7 FL (ref 35.9–50)
EST. AVERAGE GLUCOSE BLD GHB EST-MCNC: 120 MG/DL
GFR SERPLBLD CREATININE-BSD FMLA CKD-EPI: 51 ML/MIN/1.73 M 2
GLOBULIN SER CALC-MCNC: 2.5 G/DL (ref 1.9–3.5)
GLUCOSE SERPL-MCNC: 119 MG/DL (ref 65–99)
HBA1C MFR BLD: 5.8 % (ref 4–5.6)
HCT VFR BLD AUTO: 40.1 % (ref 42–52)
HGB BLD-MCNC: 13.8 G/DL (ref 14–18)
IMM GRANULOCYTES # BLD AUTO: 0.02 K/UL (ref 0–0.11)
IMM GRANULOCYTES NFR BLD AUTO: 0.3 % (ref 0–0.9)
LYMPHOCYTES # BLD AUTO: 2.73 K/UL (ref 1–4.8)
LYMPHOCYTES NFR BLD: 36.6 % (ref 22–41)
MCH RBC QN AUTO: 31.8 PG (ref 27–33)
MCHC RBC AUTO-ENTMCNC: 34.4 G/DL (ref 32.3–36.5)
MCV RBC AUTO: 92.4 FL (ref 81.4–97.8)
MONOCYTES # BLD AUTO: 0.45 K/UL (ref 0–0.85)
MONOCYTES NFR BLD AUTO: 6 % (ref 0–13.4)
NEUTROPHILS # BLD AUTO: 4.09 K/UL (ref 1.82–7.42)
NEUTROPHILS NFR BLD: 54.9 % (ref 44–72)
NRBC # BLD AUTO: 0 K/UL
NRBC BLD-RTO: 0 /100 WBC (ref 0–0.2)
PLATELET # BLD AUTO: 279 K/UL (ref 164–446)
PMV BLD AUTO: 10.6 FL (ref 9–12.9)
POTASSIUM SERPL-SCNC: 4 MMOL/L (ref 3.6–5.5)
PROT SERPL-MCNC: 6.8 G/DL (ref 6–8.2)
PSA SERPL DL<=0.01 NG/ML-MCNC: 1.48 NG/ML (ref 0–4)
RBC # BLD AUTO: 4.34 M/UL (ref 4.7–6.1)
SODIUM SERPL-SCNC: 141 MMOL/L (ref 135–145)
WBC # BLD AUTO: 7.5 K/UL (ref 4.8–10.8)

## 2025-08-12 PROCEDURE — 84153 ASSAY OF PSA TOTAL: CPT

## 2025-08-12 PROCEDURE — 85025 COMPLETE CBC W/AUTO DIFF WBC: CPT

## 2025-08-12 PROCEDURE — 83036 HEMOGLOBIN GLYCOSYLATED A1C: CPT

## 2025-08-12 PROCEDURE — 36415 COLL VENOUS BLD VENIPUNCTURE: CPT

## 2025-08-12 PROCEDURE — 80053 COMPREHEN METABOLIC PANEL: CPT

## 2025-08-12 PROCEDURE — 82306 VITAMIN D 25 HYDROXY: CPT

## 2025-08-12 ASSESSMENT — PATIENT HEALTH QUESTIONNAIRE - PHQ9: CLINICAL INTERPRETATION OF PHQ2 SCORE: 0

## 2025-08-12 ASSESSMENT — ENCOUNTER SYMPTOMS: GENERAL WELL-BEING: GOOD

## 2025-08-12 ASSESSMENT — ACTIVITIES OF DAILY LIVING (ADL): BATHING_REQUIRES_ASSISTANCE: 0

## 2025-08-12 ASSESSMENT — FIBROSIS 4 INDEX: FIB4 SCORE: 1.82

## 2025-08-14 DIAGNOSIS — N18.31 STAGE 3A CHRONIC KIDNEY DISEASE: Primary | ICD-10-CM
